# Patient Record
Sex: MALE | Race: WHITE | NOT HISPANIC OR LATINO | Employment: OTHER | URBAN - NONMETROPOLITAN AREA
[De-identification: names, ages, dates, MRNs, and addresses within clinical notes are randomized per-mention and may not be internally consistent; named-entity substitution may affect disease eponyms.]

---

## 2017-11-09 ENCOUNTER — OFFICE VISIT (OUTPATIENT)
Dept: CARDIOLOGY | Facility: CLINIC | Age: 72
End: 2017-11-09

## 2017-11-09 VITALS
HEART RATE: 86 BPM | SYSTOLIC BLOOD PRESSURE: 135 MMHG | DIASTOLIC BLOOD PRESSURE: 96 MMHG | OXYGEN SATURATION: 97 % | WEIGHT: 168.4 LBS | HEIGHT: 72 IN | BODY MASS INDEX: 22.81 KG/M2

## 2017-11-09 DIAGNOSIS — R06.02 SHORTNESS OF BREATH: ICD-10-CM

## 2017-11-09 DIAGNOSIS — R00.0 TACHYCARDIA: ICD-10-CM

## 2017-11-09 DIAGNOSIS — R00.2 PALPITATIONS: ICD-10-CM

## 2017-11-09 DIAGNOSIS — R53.83 OTHER FATIGUE: ICD-10-CM

## 2017-11-09 PROCEDURE — 93000 ELECTROCARDIOGRAM COMPLETE: CPT | Performed by: PHYSICIAN ASSISTANT

## 2017-11-09 PROCEDURE — 99204 OFFICE O/P NEW MOD 45 MIN: CPT | Performed by: PHYSICIAN ASSISTANT

## 2017-11-09 RX ORDER — LOVASTATIN 10 MG/1
20 TABLET ORAL DAILY
COMMUNITY
Start: 2014-09-25

## 2017-11-09 RX ORDER — ASPIRIN 81 MG/1
81 TABLET ORAL DAILY
COMMUNITY
End: 2023-01-12

## 2017-11-09 RX ORDER — CLONAZEPAM 0.5 MG/1
1 TABLET ORAL
COMMUNITY
Start: 2014-09-25

## 2017-11-09 NOTE — PROGRESS NOTES
Subjective   Mia Posadas is a 71 y.o. male     Chief Complaint   Patient presents with   • [facility] Care     patient appears in office today to [facility] cardiac care ; denies CP   • Irregular Heart Beat     patient states he is having arrythmia episodes    • Palpitations     patient states he does have occasional flutters with arrythmia       HPI  The patient presents today for initial evaluation.  He presents because of tachycardia palpitations.  He reports with very minor activity, he develops significant tachycardia, rates between 140-150 at times.  He then becomes very dyspneic and fatigued.  This has been a chronic issue for him but has worsened as of recent.  He had workup in 2014 including a catheterization.  Catheter that time suggested nonobstructive disease.  He had a stress test just to approximate 6 months ago.  He reports that the nuclear stress test indicated no ischemia.  No dysrhythmic symptoms were noted however this was done chemically.  He had an echo just prior to that which was apparently normal as well.  He reports that all cardiac testing was performed because of his symptoms of tachycardia with causes the fatigue and shortness of air.  The patient has no PND orthopnea.  Exercise capacity is very poor because of symptoms.  He reports that once he has onset of tachycardia, he can rest and symptoms tend to resolve.  He does have fatigue throughout the rest of the day however.  The patient has no further complaints otherwise.  He would like further evaluation because of symptoms as above.  We did do an EKG today which indicates sinus rhythm, right axis deviation, with no acute changes noted.      Current Outpatient Prescriptions   Medication Sig Dispense Refill   • aspirin 81 MG EC tablet Take 81 mg by mouth Daily.     • clonazePAM (KlonoPIN) 0.5 MG tablet Take 0.5 mg by mouth every night at bedtime.     • lovastatin (MEVACOR) 10 MG tablet Take 10 mg by mouth Daily.       No current  facility-administered medications for this visit.        Review of patient's allergies indicates no known allergies.    Past Medical History:   Diagnosis Date   • Insomnia    • Tinnitus     B ear ringing       Social History     Social History   • Marital status:      Spouse name: N/A   • Number of children: N/A   • Years of education: N/A     Occupational History   • Not on file.     Social History Main Topics   • Smoking status: Former Smoker     Packs/day: 1.00     Years: 10.00     Types: Cigarettes   • Smokeless tobacco: Current User     Types: Chew   • Alcohol use No   • Drug use: No   • Sexual activity: Defer     Other Topics Concern   • Not on file     Social History Narrative   • No narrative on file       Family History   Problem Relation Age of Onset   • Breast cancer Mother    • Arrhythmia Mother    • Mitral valve prolapse Mother    • No Known Problems Father        Review of Systems   Constitutional: Negative.  Negative for fatigue.   HENT: Positive for tinnitus ( B ear ringing). Negative for congestion, rhinorrhea, sneezing and sore throat.    Eyes: Positive for visual disturbance (wears glasses for reading).   Respiratory: Negative.  Negative for apnea, cough, chest tightness, shortness of breath (denies SOA) and wheezing.    Cardiovascular: Positive for palpitations (palpitations/flutters). Negative for chest pain (denies CP) and leg swelling.   Gastrointestinal: Negative.  Negative for abdominal distention, abdominal pain, nausea and vomiting.   Endocrine: Negative.  Negative for cold intolerance, heat intolerance, polyphagia and polyuria.   Genitourinary: Negative.  Negative for difficulty urinating, frequency and urgency.   Musculoskeletal: Positive for back pain (DDD spine). Negative for arthralgias, myalgias, neck pain and neck stiffness.   Skin: Negative.  Negative for rash and wound.   Allergic/Immunologic: Negative.  Negative for environmental allergies and food allergies.  "  Neurological: Negative.  Negative for dizziness, weakness, light-headedness and headaches.   Hematological: Negative.  Does not bruise/bleed easily.   Psychiatric/Behavioral: Positive for sleep disturbance (insomnia; denies smothering/SOA). Negative for agitation and confusion. The patient is not nervous/anxious.        Objective     /96 (BP Location: Left arm, Patient Position: Sitting)  Pulse 86  Ht 72\" (182.9 cm)  Wt 168 lb 6.4 oz (76.4 kg)  SpO2 97%  BMI 22.84 kg/m2    Lab Results (most recent)     None          Physical Exam   Constitutional: He is oriented to person, place, and time. He appears well-developed and well-nourished. No distress.   HENT:   Head: Normocephalic and atraumatic.   Eyes: Conjunctivae and EOM are normal. Pupils are equal, round, and reactive to light.   Neck: Normal range of motion. Neck supple. No JVD present. No tracheal deviation present.   Cardiovascular: Normal rate, regular rhythm, normal heart sounds and intact distal pulses.    Pulmonary/Chest: Effort normal and breath sounds normal.   Abdominal: Soft. Bowel sounds are normal. He exhibits no distension and no mass. There is no tenderness. There is no rebound and no guarding.   Musculoskeletal: Normal range of motion. He exhibits no edema, tenderness or deformity.   Neurological: He is alert and oriented to person, place, and time.   Skin: Skin is warm and dry. No rash noted. No erythema. No pallor.   Psychiatric: He has a normal mood and affect. His behavior is normal. Judgment and thought content normal.   Nursing note and vitals reviewed.      Procedure     ECG 12 Lead  Date/Time: 11/9/2017 2:32 PM  Performed by: SHELL LINTON  Authorized by: SHELL LINTON   Comments: Palpitations      Sinus rhythm, right axis deviation, no acute changes noted.                 Assessment/Plan      Diagnosis Plan   1. Palpitations  ECG 12 Lead    Treadmill Stress Test    Adult Transthoracic Echo Complete W/ Cont if Necessary " Per Protocol    Cardiac Event Monitor    Treadmill Stress Test    Adult Transthoracic Echo Complete W/ Cont if Necessary Per Protocol    Cardiac Event Monitor   2. Tachycardia  Treadmill Stress Test    Adult Transthoracic Echo Complete W/ Cont if Necessary Per Protocol    Cardiac Event Monitor    Treadmill Stress Test    Adult Transthoracic Echo Complete W/ Cont if Necessary Per Protocol    Cardiac Event Monitor   3. Shortness of breath  Treadmill Stress Test    Adult Transthoracic Echo Complete W/ Cont if Necessary Per Protocol    Cardiac Event Monitor    Treadmill Stress Test    Adult Transthoracic Echo Complete W/ Cont if Necessary Per Protocol    Cardiac Event Monitor   4. Other fatigue  Treadmill Stress Test    Adult Transthoracic Echo Complete W/ Cont if Necessary Per Protocol    Cardiac Event Monitor    Treadmill Stress Test    Adult Transthoracic Echo Complete W/ Cont if Necessary Per Protocol    Cardiac Event Monitor     Because of tachycardia/palpitations, I will schedule the patient for an event monitor.  Because symptoms tend to be exertional, I will schedule for a regular treadmill stress test to evaluate for the same.  I will schedule for an echo.  He reports that all recent labs of been normal.  We will try to obtain reports of those.  For now, I will make no changes in medications.  I will see results of the above and then we can see him back and recommend further.

## 2017-11-30 ENCOUNTER — OUTSIDE FACILITY SERVICE (OUTPATIENT)
Dept: CARDIOLOGY | Facility: CLINIC | Age: 72
End: 2017-11-30

## 2017-11-30 PROCEDURE — 93228 REMOTE 30 DAY ECG REV/REPORT: CPT | Performed by: INTERNAL MEDICINE

## 2017-12-04 ENCOUNTER — APPOINTMENT (OUTPATIENT)
Dept: CARDIOLOGY | Facility: HOSPITAL | Age: 72
End: 2017-12-04

## 2017-12-06 ENCOUNTER — OUTSIDE FACILITY SERVICE (OUTPATIENT)
Dept: CARDIOLOGY | Facility: CLINIC | Age: 72
End: 2017-12-06

## 2017-12-06 ENCOUNTER — HOSPITAL ENCOUNTER (OUTPATIENT)
Dept: CARDIOLOGY | Facility: HOSPITAL | Age: 72
Discharge: HOME | End: 2017-12-06
Admitting: PHYSICIAN ASSISTANT

## 2017-12-06 ENCOUNTER — HOSPITAL ENCOUNTER (OUTPATIENT)
Dept: CARDIOLOGY | Facility: HOSPITAL | Age: 72
Discharge: HOME | End: 2017-12-06

## 2017-12-06 LAB
MAXIMAL PREDICTED HEART RATE: 150 BPM
STRESS TARGET HR: 128 BPM

## 2017-12-06 PROCEDURE — 93306 TTE W/DOPPLER COMPLETE: CPT

## 2017-12-07 PROCEDURE — 93306 TTE W/DOPPLER COMPLETE: CPT | Performed by: INTERNAL MEDICINE

## 2017-12-08 ENCOUNTER — DOCUMENTATION (OUTPATIENT)
Dept: CARDIOLOGY | Facility: CLINIC | Age: 72
End: 2017-12-08

## 2017-12-11 PROCEDURE — 93018 CV STRESS TEST I&R ONLY: CPT | Performed by: INTERNAL MEDICINE

## 2017-12-12 ENCOUNTER — DOCUMENTATION (OUTPATIENT)
Dept: CARDIOLOGY | Facility: CLINIC | Age: 72
End: 2017-12-12

## 2018-01-10 ENCOUNTER — TELEPHONE (OUTPATIENT)
Dept: CARDIOLOGY | Facility: CLINIC | Age: 73
End: 2018-01-10

## 2018-01-11 ENCOUNTER — OFFICE VISIT (OUTPATIENT)
Dept: CARDIOLOGY | Facility: CLINIC | Age: 73
End: 2018-01-11

## 2018-01-11 VITALS
BODY MASS INDEX: 23.38 KG/M2 | HEIGHT: 72 IN | SYSTOLIC BLOOD PRESSURE: 133 MMHG | HEART RATE: 68 BPM | WEIGHT: 172.6 LBS | OXYGEN SATURATION: 97 % | DIASTOLIC BLOOD PRESSURE: 88 MMHG

## 2018-01-11 DIAGNOSIS — R53.83 OTHER FATIGUE: ICD-10-CM

## 2018-01-11 DIAGNOSIS — I35.1 AORTIC VALVE INSUFFICIENCY, ETIOLOGY OF CARDIAC VALVE DISEASE UNSPECIFIED: ICD-10-CM

## 2018-01-11 DIAGNOSIS — R06.02 SHORTNESS OF BREATH: ICD-10-CM

## 2018-01-11 DIAGNOSIS — I48.0 PAROXYSMAL ATRIAL FIBRILLATION (HCC): Primary | ICD-10-CM

## 2018-01-11 PROCEDURE — 99214 OFFICE O/P EST MOD 30 MIN: CPT | Performed by: PHYSICIAN ASSISTANT

## 2018-01-11 RX ORDER — LANSOPRAZOLE 15 MG/1
15 CAPSULE, DELAYED RELEASE ORAL DAILY
COMMUNITY
End: 2018-03-12 | Stop reason: ALTCHOICE

## 2018-01-11 RX ORDER — FLECAINIDE ACETATE 50 MG/1
50 TABLET ORAL EVERY 12 HOURS
Qty: 60 TABLET | Refills: 11 | Status: SHIPPED | OUTPATIENT
Start: 2018-01-11 | End: 2018-03-12 | Stop reason: SINTOL

## 2018-01-11 NOTE — PROGRESS NOTES
Problem list     Subjective   Mia Posadas is a 72 y.o. male     Chief Complaint   Patient presents with   • Follow-up     testing f/u    • Results     stress, echo and event monitor F/U    • Shortness of Breath   • Fatigue     anergy        HPI    Problem list  1.  Nonobstructive coronary artery disease by catheterization in 2014  1.1 regular treadmill stress test December 2017 with no evidence of ischemia  2.  His lipidemia  3.  Paroxysmal atrial fibrillation  3.1 event monitoring November 2017 demonstrates atrial fibrillation rapid  Ventricular response  3.2 chads score of 0-1 on aspirin  4.  Preserved systolic function  5.  Mild-to-moderate aortic insufficiency    Patient is a 72-year-old male that presents back to follow-up on stress test echocardiogram and event monitoring.    Patient describes doing relatively well.  He expresses no chest pain or pressure.  Dyspnea is very mild minimal at baseline and nothing progressive.  Denies PND orthopnea.  He will occasionally feel palpitations but no dizziness presyncope or syncope.  No symptoms of cerebral embolic events and otherwise is doing well    Echocardiogram demonstrates normal systolic function, mild diastolic dysfunction, mild-to-moderate AI but otherwise mildly dilated aortic root and otherwise normal  Event monitoring demonstrates paroxysmal atrial fibrillation with rapid ventricular response    Outpatient Encounter Prescriptions as of 1/11/2018   Medication Sig Dispense Refill   • aspirin 81 MG EC tablet Take 81 mg by mouth Daily.     • clonazePAM (KlonoPIN) 0.5 MG tablet Take 1 mg by mouth every night at bedtime.     • lansoprazole (PREVACID) 15 MG capsule Take 15 mg by mouth Daily.     • lovastatin (MEVACOR) 10 MG tablet Take 10 mg by mouth Daily.     • flecainide (TAMBOCOR) 50 MG tablet Take 1 tablet by mouth Every 12 (Twelve) Hours. 60 tablet 11     No facility-administered encounter medications on file as of 1/11/2018.        Review of patient's  allergies indicates no known allergies.    Past Medical History:   Diagnosis Date   • Insomnia    • Tinnitus     B ear ringing       Social History     Social History   • Marital status:      Spouse name: N/A   • Number of children: N/A   • Years of education: N/A     Occupational History   • Not on file.     Social History Main Topics   • Smoking status: Former Smoker     Packs/day: 1.00     Years: 10.00     Types: Cigarettes   • Smokeless tobacco: Current User     Types: Chew   • Alcohol use No   • Drug use: No   • Sexual activity: Defer     Other Topics Concern   • Not on file     Social History Narrative       Family History   Problem Relation Age of Onset   • Breast cancer Mother    • Arrhythmia Mother    • Mitral valve prolapse Mother    • No Known Problems Father        Review of Systems   Constitutional: Positive for fatigue (anergy ).   HENT: Positive for tinnitus. Negative for congestion, sinus pressure and voice change.    Eyes: Negative for visual disturbance.   Respiratory: Positive for shortness of breath. Negative for chest tightness, wheezing and stridor.    Cardiovascular: Positive for palpitations (rapid heart beat, runs about 120-130-s with activity ). Negative for chest pain and leg swelling.   Gastrointestinal: Negative.  Negative for abdominal pain, blood in stool (no melena, no hematuria, no hematemesis, no hematochezia ), constipation, diarrhea, nausea and vomiting.   Endocrine: Negative for cold intolerance and heat intolerance.   Genitourinary: Positive for frequency. Negative for difficulty urinating, hematuria and urgency.   Musculoskeletal: Positive for back pain.   Skin: Negative.  Negative for color change, pallor, rash and wound.   Allergic/Immunologic: Negative.  Negative for environmental allergies, food allergies and immunocompromised state.   Neurological: Positive for weakness (generalized ). Negative for dizziness, tremors, seizures, syncope, facial asymmetry (no stroke  "like symptoms), speech difficulty, light-headedness, numbness and headaches.   Psychiatric/Behavioral: Positive for sleep disturbance (only gets maybe 3-4 hrs a sleep at night ).       Objective   Vitals:    01/11/18 1425   BP: 133/88   BP Location: Left arm   Patient Position: Sitting   Pulse: 68   SpO2: 97%   Weight: 78.3 kg (172 lb 9.6 oz)   Height: 182.9 cm (72\")      /88 (BP Location: Left arm, Patient Position: Sitting)  Pulse 68  Ht 182.9 cm (72\")  Wt 78.3 kg (172 lb 9.6 oz)  SpO2 97%  BMI 23.41 kg/m2    Lab Results (most recent)     None          Physical Exam   Constitutional: He is oriented to person, place, and time. He appears well-developed and well-nourished. No distress.   HENT:   Head: Normocephalic and atraumatic.   Eyes: EOM are normal. Pupils are equal, round, and reactive to light.   Neck: No JVD present.   Cardiovascular: Normal rate, regular rhythm, normal heart sounds and intact distal pulses.  Exam reveals no gallop and no friction rub.    No murmur heard.  Pulmonary/Chest: Effort normal and breath sounds normal. No respiratory distress. He has no wheezes. He has no rales. He exhibits no tenderness.   Musculoskeletal: Normal range of motion. He exhibits no edema.   Neurological: He is alert and oriented to person, place, and time. No cranial nerve deficit.   Skin: Skin is warm and dry. No rash noted. No erythema. No pallor.   Psychiatric: He has a normal mood and affect. His behavior is normal.   Nursing note and vitals reviewed.      Procedure   Procedures       Assessment/Plan     Problems Addressed this Visit        Cardiovascular and Mediastinum    Paroxysmal atrial fibrillation - Primary    Aortic valve regurgitation       Respiratory    Shortness of breath       Other    Other fatigue          Recommendation  1.  Patient complains of shortness of breath with palpitations and mild fatigue.  Event monitoring demonstrates paroxysmal atrial fibrillation.  I feel rhythm control " would be optimal because of heart rates approaching 160 bpm during atrial fibrillation.  Therefore, because of no history of MI nor evidence of obstructive coronary artery disease, I feel flecainide could be a good antidysrhythmic medication.  He will start this on Saturday and return Tuesday for EKG 48 hours afterward.  2.  Patient's chest core 0-1 at this time.  Aspirin sufficient for stroke prophylaxis.  3.  We will see him back for follow-up as scheduled.  Follow-up primary as scheduled           Electronically signed by:

## 2018-01-22 ENCOUNTER — TELEPHONE (OUTPATIENT)
Dept: CARDIOLOGY | Facility: CLINIC | Age: 73
End: 2018-01-22

## 2018-01-22 NOTE — TELEPHONE ENCOUNTER
PATIENT WAS HERE TODAY FOR A NURSE VISIT, BUT CX'D PER ELROY TESFAYE. PATIENT TOOK FIRST DOSE OF FLECAINIDE ON SAT. AM AND APPROX. 4 HOURS LATER HAD THE FOLLOWING SX'S: NAUSEA, TINNITUS, HTN, H/R < 60, COULDN'T SLEEP THAT NIGHT. HE HAS NOT TAKEN ANYMORE, AND REFUSED TO TAKE ANYMORE, STATES TODAY IS THE FIRST DAY HE HAS FELT BETTER. HE STATED HE WAS NOT GOING TO TAKE ANY OTHER MEDS EITHER. PATIENT CAME WITH MONITOR RESULTS IN HAND AND STATED SHOWED ME WHERE HE HAD HAD ONLY 1 EPISODE OF A-FIB WITH H/R 100, AND NO MORE. V/S'S TODAY 144/93, H/R 66, 96%, WEIGHT 175.8. ELROY TESFAYE AWARE OF ABOVE AND STATES PATIENT MAY HAVE MORE EPISODES OF A-FIB, BUT SINCE DOESN'T WANT TO TAKE MEDS THEN SCHEDULE 4-6 WEEK. F/U. PATIENT HAD A F/U APPT. ALREADY IN MAY, BUT STATED HE WOULD COME BACK IN MIDDLE OF MARCH. I ASKED PATIENT IF HE COULD TELL WHEN HE WAS IN A-FIB AND HE STATED HE DID ONE TIME. I DISCUSSED WITH HIM THAT SOME PEOPLE CAN TELL WHEN THEY ARE IN A-FIB AND SOME CAN'T AND THAT HE MAY NOT BE ABLE TO TELL AND THAT THIS PUTS HIM AT INCREASED RISK, AND HE VERBALIZED HE UNDERSTOOD. TO CALL OFFICE WITH ANY PROBLEMS. SHAVON STOCK

## 2018-03-12 ENCOUNTER — OFFICE VISIT (OUTPATIENT)
Dept: CARDIOLOGY | Facility: CLINIC | Age: 73
End: 2018-03-12

## 2018-03-12 VITALS
DIASTOLIC BLOOD PRESSURE: 87 MMHG | HEART RATE: 82 BPM | BODY MASS INDEX: 23.16 KG/M2 | HEIGHT: 72 IN | SYSTOLIC BLOOD PRESSURE: 129 MMHG | WEIGHT: 171 LBS | OXYGEN SATURATION: 96 %

## 2018-03-12 DIAGNOSIS — R00.2 PALPITATIONS: ICD-10-CM

## 2018-03-12 DIAGNOSIS — I48.0 PAROXYSMAL ATRIAL FIBRILLATION (HCC): ICD-10-CM

## 2018-03-12 DIAGNOSIS — I10 ESSENTIAL HYPERTENSION: ICD-10-CM

## 2018-03-12 PROCEDURE — 99213 OFFICE O/P EST LOW 20 MIN: CPT | Performed by: PHYSICIAN ASSISTANT

## 2018-03-12 RX ORDER — DIPHENHYDRAMINE HCL 25 MG
25 CAPSULE ORAL
COMMUNITY

## 2018-03-12 NOTE — PROGRESS NOTES
Problem list     Subjective   Mia Posadas is a 72 y.o. male     Chief Complaint   Patient presents with   • Follow-up     patient appears in office today for follow up    • Atrial Fibrillation       HPI  The patient presents in for follow-up.  We had initially seen him in the setting of symptoms certainly to include palpitations.  His stress test and echo studies were basically unremarkable.  Event monitor indicated an episode of atrial fibrillation.  He was seen back through the clinic and started on flecainide therapy.  He cannot tolerate that because of symptoms.  He has not taken anything else.  He does have occasional episodes of palpitations but denies any significant or sustained dysrhythmic activity.  The patient has no chest pain of significance.  He reports stable dyspnea.  He has no further complaints otherwise.    Current Outpatient Prescriptions   Medication Sig Dispense Refill   • aspirin 81 MG EC tablet Take 81 mg by mouth Daily.     • clonazePAM (KlonoPIN) 0.5 MG tablet Take 1 mg by mouth every night at bedtime.     • diphenhydrAMINE (BENADRYL) 25 mg capsule Take 25 mg by mouth every night at bedtime.     • lovastatin (MEVACOR) 10 MG tablet Take 10 mg by mouth Daily.     • metoprolol tartrate (LOPRESSOR) 25 MG tablet Take 1 tablet by mouth 2 (Two) Times a Day. 60 tablet 11     No current facility-administered medications for this visit.        Flecainide    Past Medical History:   Diagnosis Date   • Insomnia    • Tinnitus     B ear ringing       Social History     Social History   • Marital status:      Spouse name: N/A   • Number of children: N/A   • Years of education: N/A     Occupational History   • Not on file.     Social History Main Topics   • Smoking status: Former Smoker     Packs/day: 1.00     Years: 10.00     Types: Cigarettes   • Smokeless tobacco: Current User     Types: Chew   • Alcohol use No   • Drug use: No   • Sexual activity: Defer     Other Topics Concern   • Not on  "file     Social History Narrative   • No narrative on file       Family History   Problem Relation Age of Onset   • Breast cancer Mother    • Arrhythmia Mother    • Mitral valve prolapse Mother    • No Known Problems Father        Review of Systems   Constitutional: Negative.  Negative for fatigue.   HENT: Negative.  Negative for congestion, rhinorrhea, sneezing and sore throat.    Eyes: Positive for visual disturbance (wears reading glasses).   Respiratory: Negative.  Negative for apnea, cough, chest tightness, shortness of breath (denies SOA) and wheezing.    Cardiovascular: Negative.  Negative for chest pain (denies CP), palpitations (denies palpitations) and leg swelling.   Gastrointestinal: Positive for nausea (frequent nausea). Negative for abdominal distention, abdominal pain and vomiting.   Endocrine: Negative.  Negative for cold intolerance, polyphagia and polyuria.   Genitourinary: Negative.  Negative for difficulty urinating, frequency and urgency.   Musculoskeletal: Negative.  Negative for arthralgias, back pain, myalgias and neck stiffness.   Skin: Negative.  Negative for rash and wound.   Allergic/Immunologic: Negative.  Negative for environmental allergies and food allergies.   Neurological: Negative.  Negative for dizziness, weakness, light-headedness and headaches.   Hematological: Negative.  Does not bruise/bleed easily.   Psychiatric/Behavioral: Positive for agitation (easily agitated). Negative for confusion and sleep disturbance (denies waking up smothering/SOA). The patient is not nervous/anxious.        Objective   Vitals:    03/12/18 1007   BP: 129/87   BP Location: Left arm   Patient Position: Sitting   Pulse: 82   SpO2: 96%   Weight: 77.6 kg (171 lb)   Height: 182.9 cm (72\")      /87 (BP Location: Left arm, Patient Position: Sitting)   Pulse 82   Ht 182.9 cm (72\")   Wt 77.6 kg (171 lb)   SpO2 96%   BMI 23.19 kg/m²    Lab Results (most recent)     None        Physical Exam "   Constitutional: He is oriented to person, place, and time. He appears well-developed and well-nourished. No distress.   HENT:   Head: Normocephalic and atraumatic.   Eyes: EOM are normal. Pupils are equal, round, and reactive to light.   Neck: No JVD present.   Cardiovascular: Normal rate, regular rhythm, normal heart sounds and intact distal pulses.  Exam reveals no gallop and no friction rub.    No murmur heard.  Pulmonary/Chest: Effort normal and breath sounds normal. No respiratory distress. He has no wheezes. He has no rales. He exhibits no tenderness.   Musculoskeletal: Normal range of motion. He exhibits no edema.   Neurological: He is alert and oriented to person, place, and time. No cranial nerve deficit.   Skin: Skin is warm and dry. No rash noted. No erythema. No pallor.   Psychiatric: He has a normal mood and affect. His behavior is normal.   Nursing note and vitals reviewed.        Procedure   Procedures       Assessment/Plan      Diagnosis Plan   1. Paroxysmal atrial fibrillation     2. Palpitations     3. Essential hypertension  metoprolol tartrate (LOPRESSOR) 25 MG tablet       Previous cardiac workup is been benign with the exception of his event monitor suggesting atrial fibrillation, all as above.  The patient did not tolerate flecainide therapy.  He does not want to rechallenge any antidysrhythmic therapy for now.  I will start the patient on low-dose beta blocker because of the A. fib and intermittent palpitations.  His CHADS score is currently 1.  I will continue aspirin alone for now.  The patient will call for any complications.  Otherwise we'll see him back in 2-3 months.  Nothing further for now.              Patient's BMI is within normal parameters. No follow-up required.             Electronically signed by:

## 2018-06-12 ENCOUNTER — OFFICE VISIT (OUTPATIENT)
Dept: CARDIOLOGY | Facility: CLINIC | Age: 73
End: 2018-06-12

## 2018-06-12 VITALS
HEIGHT: 72 IN | HEART RATE: 60 BPM | BODY MASS INDEX: 23.7 KG/M2 | OXYGEN SATURATION: 98 % | SYSTOLIC BLOOD PRESSURE: 140 MMHG | DIASTOLIC BLOOD PRESSURE: 89 MMHG | WEIGHT: 175 LBS

## 2018-06-12 DIAGNOSIS — R06.02 SHORTNESS OF BREATH: ICD-10-CM

## 2018-06-12 DIAGNOSIS — I48.0 PAROXYSMAL ATRIAL FIBRILLATION (HCC): Primary | ICD-10-CM

## 2018-06-12 DIAGNOSIS — R00.2 PALPITATIONS: ICD-10-CM

## 2018-06-12 DIAGNOSIS — I10 ESSENTIAL HYPERTENSION: ICD-10-CM

## 2018-06-12 PROCEDURE — 99213 OFFICE O/P EST LOW 20 MIN: CPT | Performed by: PHYSICIAN ASSISTANT

## 2018-06-12 PROCEDURE — 93000 ELECTROCARDIOGRAM COMPLETE: CPT | Performed by: PHYSICIAN ASSISTANT

## 2018-06-12 NOTE — PROGRESS NOTES
Problem list     Subjective   Mia Posadas is a 72 y.o. male     Chief Complaint   Patient presents with   • Follow-up     patient appears in office today for follow up    • Atrial Fibrillation       HPI  The patient presents in today for follow-up.  He was diagnosed with atrial fibrillation by previous testing.  Previous stress and echo studies otherwise were unremarkable.  He was tried on flecainide cannot tolerate that because of side effects.  He was placed on metoprolol for rate suppression.  We had discussed consideration for anticoagulation, but held on that.  We wanted to see him back today to see how he has symptomatically.  Currently, he has no chest pain.  He has stable dyspnea.  He relates to intermittent palpitations but nothing really of significance.  He tells me that the majority of his episodes of tachycardia and palpitations occur when overheated when working outside.  Unfortunately, he did have an MRI of the brain since last appointment.  He was told that he had a previous stroke.    Current Outpatient Prescriptions   Medication Sig Dispense Refill   • aspirin 81 MG EC tablet Take 81 mg by mouth Daily.     • clonazePAM (KlonoPIN) 0.5 MG tablet Take 1 mg by mouth every night at bedtime.     • diphenhydrAMINE (BENADRYL) 25 mg capsule Take 25 mg by mouth every night at bedtime.     • lovastatin (MEVACOR) 10 MG tablet Take 10 mg by mouth Daily.     • metoprolol tartrate (LOPRESSOR) 25 MG tablet Take 1 tablet by mouth 2 (Two) Times a Day. 60 tablet 11     No current facility-administered medications for this visit.        Flecainide    Past Medical History:   Diagnosis Date   • Insomnia    • Tinnitus     B ear ringing       Social History     Social History   • Marital status:      Spouse name: N/A   • Number of children: N/A   • Years of education: N/A     Occupational History   • Not on file.     Social History Main Topics   • Smoking status: Former Smoker     Packs/day: 1.00     Years:  "10.00     Types: Cigarettes   • Smokeless tobacco: Former User     Types: Chew   • Alcohol use No   • Drug use: No   • Sexual activity: Defer     Other Topics Concern   • Not on file     Social History Narrative   • No narrative on file       Family History   Problem Relation Age of Onset   • Breast cancer Mother    • Arrhythmia Mother    • Mitral valve prolapse Mother    • No Known Problems Father        Review of Systems   Constitutional: Positive for fatigue (easily fatigued).   HENT: Negative.  Negative for congestion, rhinorrhea, sneezing and sore throat.    Eyes: Positive for visual disturbance (wears reading glasses).   Respiratory: Negative.  Negative for apnea, cough, chest tightness, shortness of breath (denies SOA) and wheezing.    Cardiovascular: Negative.  Negative for chest pain (denies CP), palpitations (denies palpitations) and leg swelling.   Gastrointestinal: Positive for nausea (occasional nausea). Negative for abdominal distention, abdominal pain and vomiting.   Endocrine: Negative.  Negative for cold intolerance, heat intolerance, polyphagia and polyuria.   Musculoskeletal: Positive for arthralgias (joints), back pain (lower back and DDD), neck pain (neck pain due to arthritis and DDD) and neck stiffness (due to arthritis). Negative for myalgias.   Skin: Negative.  Negative for rash and wound.   Allergic/Immunologic: Negative.  Negative for environmental allergies and food allergies.   Neurological: Negative.  Negative for dizziness, weakness, light-headedness and headaches.   Hematological: Negative.  Does not bruise/bleed easily.   Psychiatric/Behavioral: Positive for agitation (easily agitated). Negative for confusion and sleep disturbance (denies waking up smothering/SOA). The patient is not nervous/anxious.        Objective   Vitals:    06/12/18 0859   BP: 140/89   BP Location: Left arm   Patient Position: Sitting   Pulse: 60   SpO2: 98%   Weight: 79.4 kg (175 lb)   Height: 182.9 cm (72\") " "     /89 (BP Location: Left arm, Patient Position: Sitting)   Pulse 60   Ht 182.9 cm (72\")   Wt 79.4 kg (175 lb)   SpO2 98%   BMI 23.73 kg/m²    Lab Results (most recent)     None        Physical Exam   Constitutional: He is oriented to person, place, and time. He appears well-developed and well-nourished. No distress.   HENT:   Head: Normocephalic and atraumatic.   Eyes: EOM are normal. Pupils are equal, round, and reactive to light.   Neck: No JVD present.   Cardiovascular: Normal rate, regular rhythm, normal heart sounds and intact distal pulses.  Exam reveals no gallop and no friction rub.    No murmur heard.  Pulmonary/Chest: Effort normal and breath sounds normal. No respiratory distress. He has no wheezes. He has no rales. He exhibits no tenderness.   Musculoskeletal: Normal range of motion. He exhibits no edema.   Neurological: He is alert and oriented to person, place, and time. No cranial nerve deficit.   Skin: Skin is warm and dry. No rash noted. No erythema. No pallor.   Psychiatric: He has a normal mood and affect. His behavior is normal.   Nursing note and vitals reviewed.        Procedure     ECG 12 Lead  Date/Time: 6/12/2018 9:07 AM  Performed by: SHELL LINTON  Authorized by: SHELL LINTON   Comments: Atrial fib    Sinus rhythm at 54, normal axis, early precordial R-wave progression, no acute changes noted.               Assessment/Plan      Diagnosis Plan   1. Paroxysmal atrial fibrillation  ECG 12 Lead   2. Palpitations     3. Essential hypertension     4. Shortness of breath         With history of CVA by MRI, I feel that the patient needs anticoagulation.  He was to discuss this with his family and if he decides institute that he will call.  He is well aware of CVA risk however with A. fib.  We encouraged that several times today.  Previous stress and echo studies of been unremarkable.  I will continue medications with no changes otherwise.  We will see the patient back in 2-3 " months just to evaluate course.  He will call me however with this decision on anticoagulation quickly.           Patient's Body mass index is 23.73 kg/m². BMI is within normal parameters. No follow-up required.             Electronically signed by:      Answers for HPI/ROS submitted by the patient on 6/5/2018   Neurologic complaint  Onset: more than 1 year ago  Onset quality: gradually  Progression since onset: rapidly worsening  auditory change: Yes  aura: No  bladder incontinence: No  bowel incontinence: No  vertigo: No  Treatments tried: acetaminophen, aspirin, medication, position change  Improvement on treatment: no relief

## 2018-11-29 ENCOUNTER — OFFICE VISIT (OUTPATIENT)
Dept: CARDIOLOGY | Facility: CLINIC | Age: 73
End: 2018-11-29

## 2018-11-29 VITALS
OXYGEN SATURATION: 96 % | HEIGHT: 72 IN | SYSTOLIC BLOOD PRESSURE: 136 MMHG | HEART RATE: 72 BPM | DIASTOLIC BLOOD PRESSURE: 84 MMHG | BODY MASS INDEX: 23.78 KG/M2 | WEIGHT: 175.6 LBS

## 2018-11-29 DIAGNOSIS — R00.2 PALPITATIONS: ICD-10-CM

## 2018-11-29 DIAGNOSIS — R06.02 SHORTNESS OF BREATH: ICD-10-CM

## 2018-11-29 DIAGNOSIS — I48.0 PAROXYSMAL ATRIAL FIBRILLATION (HCC): Primary | ICD-10-CM

## 2018-11-29 PROCEDURE — 99213 OFFICE O/P EST LOW 20 MIN: CPT | Performed by: PHYSICIAN ASSISTANT

## 2018-11-29 NOTE — PROGRESS NOTES
Problem list     Subjective   Mia Posadas is a 72 y.o. male     Chief Complaint   Patient presents with   • Follow-up     patient appears in office today for 3 month follow up    • Atrial Fibrillation       HPI  The patient presents back for review and evaluation.  History includes atrial fibrillation.  We detected this on his event monitor previously.  We had recommended consideration for anticoagulation, particularly with history of stroke.  The patient tells me today that he does not believe he has A. fib.  He is concerned about the findings of the event monitor.  I reviewed the telemetry slip with the patient and reviewed the specific episodes of A. fib.  He does not feel that he has that problem.  He therefore does not feel that he needs anticoagulation.  I reviewed with him that his risk is very high because of A. fib and history of stroke of another recurrent stroke.  The patient acknowledges his understanding and tells me that aspirin alone is fine for him.    He also tells me that he is doing well with just rate control medications.  We have tried him on flecainide previously and he cannot tolerate that.  He tells me that he, approximate 1, month, I will have a pulse excursion.  He reports that typically this would be to 120 bpm and last only a few minutes.  Once he sits down and rests, his symptoms resolved.  Otherwise, the patient has no chest pain.  He reports stable dyspnea.  He has no PND orthopnea.  He has no further complaints otherwise.    Current Outpatient Medications   Medication Sig Dispense Refill   • aspirin 81 MG EC tablet Take 81 mg by mouth Daily.     • clonazePAM (KlonoPIN) 0.5 MG tablet Take 1 mg by mouth every night at bedtime.     • diphenhydrAMINE (BENADRYL) 25 mg capsule Take 25 mg by mouth every night at bedtime.     • lovastatin (MEVACOR) 10 MG tablet Take 10 mg by mouth Daily.     • metoprolol tartrate (LOPRESSOR) 25 MG tablet Take 1 tablet by mouth 2 (Two) Times a Day.  (Patient taking differently: Take 25 mg by mouth As Needed (if HR elevated).) 60 tablet 11     No current facility-administered medications for this visit.        Flecainide    Past Medical History:   Diagnosis Date   • Insomnia    • Tinnitus     B ear ringing       Social History     Socioeconomic History   • Marital status:      Spouse name: Not on file   • Number of children: Not on file   • Years of education: Not on file   • Highest education level: Not on file   Social Needs   • Financial resource strain: Not on file   • Food insecurity - worry: Not on file   • Food insecurity - inability: Not on file   • Transportation needs - medical: Not on file   • Transportation needs - non-medical: Not on file   Occupational History   • Not on file   Tobacco Use   • Smoking status: Former Smoker     Packs/day: 1.00     Years: 10.00     Pack years: 10.00     Types: Cigarettes   • Smokeless tobacco: Former User     Types: Chew   Substance and Sexual Activity   • Alcohol use: No   • Drug use: No   • Sexual activity: Defer   Other Topics Concern   • Not on file   Social History Narrative   • Not on file       Family History   Problem Relation Age of Onset   • Breast cancer Mother    • Arrhythmia Mother    • Mitral valve prolapse Mother    • No Known Problems Father        Review of Systems   Constitutional: Negative.  Negative for fatigue.   HENT: Positive for hearing loss (B hearing loss). Negative for congestion, rhinorrhea, sneezing and sore throat.    Eyes: Positive for visual disturbance (wears glasses PRN).   Respiratory: Negative.  Negative for apnea, cough, chest tightness, shortness of breath (denies CP) and wheezing.    Cardiovascular: Negative.  Negative for chest pain (denies CP), palpitations (denies palpitations) and leg swelling.   Gastrointestinal: Negative.  Negative for abdominal distention, abdominal pain, nausea and vomiting.   Endocrine: Negative.  Negative for cold intolerance and heat  "intolerance.   Genitourinary: Negative.  Negative for difficulty urinating, frequency and urgency.   Musculoskeletal: Positive for arthralgias (joints) and back pain (back pain from arthritis). Negative for myalgias, neck pain and neck stiffness.   Skin: Negative.  Negative for rash and wound.   Allergic/Immunologic: Negative.  Negative for environmental allergies and food allergies.   Neurological: Negative.  Negative for dizziness, syncope, weakness, light-headedness and headaches.   Hematological: Bruises/bleeds easily (bruises easily).   Psychiatric/Behavioral: Positive for agitation (easily agitated). Negative for confusion and sleep disturbance (denies waking up smothering/SOA). The patient is nervous/anxious (easily nervous/anxious).        Objective   Vitals:    11/29/18 1019   BP: 136/84   BP Location: Left arm   Patient Position: Sitting   Pulse: 72   SpO2: 96%   Weight: 79.7 kg (175 lb 9.6 oz)   Height: 182.9 cm (72\")      /84 (BP Location: Left arm, Patient Position: Sitting)   Pulse 72   Ht 182.9 cm (72\")   Wt 79.7 kg (175 lb 9.6 oz)   SpO2 96%   BMI 23.82 kg/m²    Lab Results (most recent)     None        Physical Exam   Constitutional: He is oriented to person, place, and time. He appears well-developed and well-nourished. No distress.   HENT:   Head: Normocephalic and atraumatic.   Eyes: EOM are normal. Pupils are equal, round, and reactive to light.   Neck: No JVD present.   Cardiovascular: Normal rate, regular rhythm, normal heart sounds and intact distal pulses. Exam reveals no gallop and no friction rub.   No murmur heard.  Pulmonary/Chest: Effort normal and breath sounds normal. No respiratory distress. He has no wheezes. He has no rales. He exhibits no tenderness.   Musculoskeletal: Normal range of motion. He exhibits no edema.   Neurological: He is alert and oriented to person, place, and time. No cranial nerve deficit.   Skin: Skin is warm and dry. No rash noted. No erythema. No " pallor.   Psychiatric: He has a normal mood and affect. His behavior is normal.   Nursing note and vitals reviewed.        Procedure   Procedures       Assessment/Plan      Diagnosis Plan   1. Paroxysmal atrial fibrillation (CMS/HCC)     2. Shortness of breath     3. Palpitations         The patient tells me that he is doing well.  I will make no changes.  We can see him back in 4 months, sooner if indicated by course.  He again does not want any consideration for anticoagulation therapy, all as outlined above.         Patient's Body mass index is 23.82 kg/m². BMI is within normal parameters. No follow-up required.             Electronically signed by:

## 2019-03-28 ENCOUNTER — OFFICE VISIT (OUTPATIENT)
Dept: CARDIOLOGY | Facility: CLINIC | Age: 74
End: 2019-03-28

## 2019-03-28 VITALS
WEIGHT: 169.4 LBS | SYSTOLIC BLOOD PRESSURE: 123 MMHG | DIASTOLIC BLOOD PRESSURE: 88 MMHG | OXYGEN SATURATION: 97 % | BODY MASS INDEX: 22.94 KG/M2 | HEIGHT: 72 IN | HEART RATE: 86 BPM

## 2019-03-28 DIAGNOSIS — R00.2 PALPITATIONS: ICD-10-CM

## 2019-03-28 DIAGNOSIS — R06.02 SHORTNESS OF BREATH: ICD-10-CM

## 2019-03-28 DIAGNOSIS — I48.0 PAROXYSMAL ATRIAL FIBRILLATION (HCC): Primary | ICD-10-CM

## 2019-03-28 PROCEDURE — 99213 OFFICE O/P EST LOW 20 MIN: CPT | Performed by: PHYSICIAN ASSISTANT

## 2019-03-28 PROCEDURE — 93000 ELECTROCARDIOGRAM COMPLETE: CPT | Performed by: PHYSICIAN ASSISTANT

## 2019-03-28 NOTE — PROGRESS NOTES
Problem list     Subjective   Mia Posadas is a 73 y.o. male     Chief Complaint   Patient presents with   • Atrial Fibrillation       HPI  The patient presents in today for follow-up.  History includes A. fib is noted by previous event monitor.  The patient feels that this likely could have been artifact or mistake by the telemetry and has had difficulties in the past excepting a diagnosis of A. fib.  He has also been hesitant to take the metoprolol daily, concerned with side effects.  We have discussed anticoagulation therapies with him in the past.  He would not want anticoagulation.  The patient tells me that his palpitations and episodes of tachycardia however have worsened.  He has been having increasing frequency of palpitations and tachycardia.  His episodes appear to be occurring slightly longer and appear a little more severe with regards to his symptoms of dysrhythmias.  He has no associated chest pain.  He has stable dyspnea.  He has no PND orthopnea.  He has no further complaints otherwise.    Current Outpatient Medications   Medication Sig Dispense Refill   • aspirin 81 MG EC tablet Take 81 mg by mouth Daily.     • clonazePAM (KlonoPIN) 0.5 MG tablet Take 1 mg by mouth every night at bedtime.     • diphenhydrAMINE (BENADRYL) 25 mg capsule Take 25 mg by mouth every night at bedtime.     • lovastatin (MEVACOR) 10 MG tablet Take 10 mg by mouth Daily.     • metoprolol tartrate (LOPRESSOR) 25 MG tablet Take 1 tablet by mouth Daily. 30 tablet 3     No current facility-administered medications for this visit.        Flecainide    Past Medical History:   Diagnosis Date   • Atrial fibrillation (CMS/HCC)    • Insomnia    • Tinnitus     B ear ringing       Social History     Socioeconomic History   • Marital status:      Spouse name: Not on file   • Number of children: Not on file   • Years of education: Not on file   • Highest education level: Not on file   Tobacco Use   • Smoking status: Former Smoker  "    Packs/day: 1.00     Years: 10.00     Pack years: 10.00     Types: Cigarettes   • Smokeless tobacco: Former User     Types: Chew   Substance and Sexual Activity   • Alcohol use: No   • Drug use: No   • Sexual activity: Defer       Family History   Problem Relation Age of Onset   • Breast cancer Mother    • Arrhythmia Mother    • Mitral valve prolapse Mother    • No Known Problems Father        Review of Systems   Constitutional: Positive for fatigue (easily fatigued).   HENT: Negative.  Negative for congestion, rhinorrhea and sneezing.    Eyes: Positive for visual disturbance (wears reading glasses).   Respiratory: Positive for shortness of breath (easily SOA ; worse on exertion ). Negative for cough, chest tightness and wheezing.    Cardiovascular: Positive for palpitations (palpitations/flutters on occasion). Negative for chest pain and leg swelling.   Gastrointestinal: Negative.  Negative for abdominal pain, diarrhea and vomiting.   Endocrine: Negative.  Negative for cold intolerance and heat intolerance.   Genitourinary: Negative.  Negative for difficulty urinating, frequency and urgency.   Musculoskeletal: Positive for arthralgias (joints). Negative for back pain, myalgias, neck pain and neck stiffness.   Skin: Negative.  Negative for rash and wound.   Allergic/Immunologic: Negative.  Negative for environmental allergies and food allergies.   Neurological: Negative.  Negative for dizziness, syncope, weakness, light-headedness and headaches.   Hematological: Bruises/bleeds easily (bruises easily).   Psychiatric/Behavioral: Negative for agitation, confusion and sleep disturbance (denies waking up smothering/SOA). The patient is nervous/anxious (easily nervous/anxious).        Objective   Vitals:    03/28/19 1002   BP: 123/88   BP Location: Left arm   Patient Position: Sitting   Pulse: 86   SpO2: 97%   Weight: 76.8 kg (169 lb 6.4 oz)   Height: 182.9 cm (72\")      /88 (BP Location: Left arm, Patient " "Position: Sitting)   Pulse 86   Ht 182.9 cm (72\")   Wt 76.8 kg (169 lb 6.4 oz)   SpO2 97%   BMI 22.97 kg/m²    Lab Results (most recent)     None        Physical Exam   Constitutional: He is oriented to person, place, and time. He appears well-developed and well-nourished. No distress.   HENT:   Head: Normocephalic and atraumatic.   Eyes: EOM are normal. Pupils are equal, round, and reactive to light.   Neck: No JVD present.   Cardiovascular: Normal rate, regular rhythm, normal heart sounds and intact distal pulses. Exam reveals no gallop and no friction rub.   No murmur heard.  Pulmonary/Chest: Effort normal and breath sounds normal. No respiratory distress. He has no wheezes. He has no rales. He exhibits no tenderness.   Musculoskeletal: Normal range of motion. He exhibits no edema.   Neurological: He is alert and oriented to person, place, and time. No cranial nerve deficit.   Skin: Skin is warm and dry. No rash noted. No erythema. No pallor.   Psychiatric: He has a normal mood and affect. His behavior is normal.   Nursing note and vitals reviewed.        Procedure     ECG 12 Lead  Date/Time: 3/28/2019 10:24 AM  Performed by: Ethan Garcia PA  Authorized by: Ethan Garcia PA   Comments: Atrial fib    Sinus rhythm 73, normal axis, no acute changes noted.               Assessment/Plan      Diagnosis Plan   1. Paroxysmal atrial fibrillation (CMS/HCC)  ECG 12 Lead    Cardiac Event Monitor   2. Shortness of breath  ECG 12 Lead    Cardiac Event Monitor   3. Palpitations  Cardiac Event Monitor       The patient does agree to consider repeating an event monitor.  His palpitations/tachycardia, presumably representative of sensed episodes of A. fib, have worsened as of recent.  He also agrees to go ahead and restart metoprolol on a daily basis as prescribed.  Pending results, we had discussed consideration for rechallenge of antidysrhythmic therapy with the patient.  He might agree to that pending results of " event monitor.  We will see him back after event monitor findings are known and recommended further at that time.  For now he agrees to continue aspirin alone in combination with his metoprolol therapy.  He will call for any issues prior to follow-up.         Patient's Body mass index is 22.97 kg/m². BMI is within normal parameters. No follow-up required..             Electronically signed by:

## 2019-05-06 ENCOUNTER — OUTSIDE FACILITY SERVICE (OUTPATIENT)
Dept: CARDIOLOGY | Facility: CLINIC | Age: 74
End: 2019-05-06

## 2019-05-08 ENCOUNTER — OUTSIDE FACILITY SERVICE (OUTPATIENT)
Dept: CARDIOLOGY | Facility: CLINIC | Age: 74
End: 2019-05-08
Payer: MEDICARE

## 2019-05-08 PROCEDURE — 93228 REMOTE 30 DAY ECG REV/REPORT: CPT | Performed by: INTERNAL MEDICINE

## 2019-07-01 ENCOUNTER — OFFICE VISIT (OUTPATIENT)
Dept: CARDIOLOGY | Facility: CLINIC | Age: 74
End: 2019-07-01

## 2019-07-01 VITALS
WEIGHT: 175 LBS | BODY MASS INDEX: 23.7 KG/M2 | HEIGHT: 72 IN | HEART RATE: 60 BPM | DIASTOLIC BLOOD PRESSURE: 85 MMHG | SYSTOLIC BLOOD PRESSURE: 136 MMHG | OXYGEN SATURATION: 96 %

## 2019-07-01 DIAGNOSIS — R06.02 SHORTNESS OF BREATH: ICD-10-CM

## 2019-07-01 DIAGNOSIS — R53.83 OTHER FATIGUE: ICD-10-CM

## 2019-07-01 DIAGNOSIS — I48.0 PAROXYSMAL ATRIAL FIBRILLATION (HCC): Primary | ICD-10-CM

## 2019-07-01 PROCEDURE — 99213 OFFICE O/P EST LOW 20 MIN: CPT | Performed by: PHYSICIAN ASSISTANT

## 2019-07-01 RX ORDER — AMITRIPTYLINE HYDROCHLORIDE 25 MG/1
25 TABLET, FILM COATED ORAL AS NEEDED
COMMUNITY

## 2019-07-01 NOTE — PROGRESS NOTES
Problem list     Subjective   Mia Posadas is a 73 y.o. male     Chief Complaint   Patient presents with   • Atrial Fibrillation       HPI  The patient presents back today to review event monitor results.  We had seen him previously for follow-up on a stress test, echo, and event monitor.  That follow-up was a couple of years ago.  His stress test indicated no ischemia.  His echo indicated preserved systolic function with no significant valvular issues.  Monitor indicated brief episodes of A. fib.  We discussed this with him at follow-up and he did not believe that he had atrial fibrillation.  We discussed consideration even for anticoagulation which he did not want as he did not feel that he had atrial fibrillation.  We wanted to repeat a monitor at the patient's request because of increasing palpitations.  The follow-up monitor indicated no evidence of A. fib.  He had sinus rhythm throughout with bradycardia noted mostly at night.  Symptomatically, the patient appears to be doing fairly well.  He has restarted metoprolol and reports that he feels that his palpitations have minimized with that.  He has stable dyspnea.  He has no chest pain.  He has no failure symptoms.  He has no complications or complaints otherwise.    Current Outpatient Medications   Medication Sig Dispense Refill   • amitriptyline (ELAVIL) 25 MG tablet Take 25 mg by mouth Every Night.     • aspirin 81 MG EC tablet Take 81 mg by mouth Daily.     • clonazePAM (KlonoPIN) 0.5 MG tablet Take 1 mg by mouth every night at bedtime.     • diphenhydrAMINE (BENADRYL) 25 mg capsule Take 25 mg by mouth every night at bedtime.     • lovastatin (MEVACOR) 10 MG tablet Take 10 mg by mouth Daily.     • metoprolol tartrate (LOPRESSOR) 25 MG tablet Take 1 tablet by mouth Daily. 30 tablet 3     No current facility-administered medications for this visit.        Flecainide    Past Medical History:   Diagnosis Date   • Atrial fibrillation (CMS/HCC)    • Insomnia     • Tinnitus     B ear ringing       Social History     Socioeconomic History   • Marital status:      Spouse name: Not on file   • Number of children: Not on file   • Years of education: Not on file   • Highest education level: Not on file   Tobacco Use   • Smoking status: Former Smoker     Packs/day: 1.00     Years: 10.00     Pack years: 10.00     Types: Cigarettes   • Smokeless tobacco: Former User     Types: Chew   Substance and Sexual Activity   • Alcohol use: No   • Drug use: No   • Sexual activity: Defer       Family History   Problem Relation Age of Onset   • Breast cancer Mother    • Arrhythmia Mother    • Mitral valve prolapse Mother    • No Known Problems Father        Review of Systems   Constitutional: Positive for fatigue (easily fatigued).   HENT: Negative.  Negative for congestion, rhinorrhea and sneezing.    Eyes: Positive for visual disturbance (wears reading glasses).   Respiratory: Negative.  Negative for cough, chest tightness, shortness of breath and wheezing.    Cardiovascular: Negative.  Negative for chest pain, palpitations and leg swelling.   Gastrointestinal: Negative.  Negative for abdominal pain, nausea and vomiting.   Endocrine: Negative.  Negative for cold intolerance and heat intolerance.   Genitourinary: Negative.  Negative for difficulty urinating, frequency and urgency.   Musculoskeletal: Positive for arthralgias (joints). Negative for back pain, neck pain and neck stiffness.   Skin: Negative.  Negative for rash and wound.   Allergic/Immunologic: Negative.  Negative for environmental allergies and food allergies.   Neurological: Negative.  Negative for dizziness, syncope, weakness, light-headedness and headaches.   Hematological: Bruises/bleeds easily (bruises easily).   Psychiatric/Behavioral: Negative.  Negative for agitation, confusion and sleep disturbance (denies waking up smothering/SOA). The patient is not nervous/anxious.        Objective   Vitals:    07/01/19 0928  "  BP: 136/85   BP Location: Left arm   Patient Position: Sitting   Pulse: 60   SpO2: 96%   Weight: 79.4 kg (175 lb)   Height: 182.9 cm (72\")      /85 (BP Location: Left arm, Patient Position: Sitting)   Pulse 60   Ht 182.9 cm (72\")   Wt 79.4 kg (175 lb)   SpO2 96%   BMI 23.73 kg/m²    Lab Results (most recent)     None        Physical Exam   Constitutional: He is oriented to person, place, and time. He appears well-developed and well-nourished. No distress.   HENT:   Head: Normocephalic and atraumatic.   Eyes: EOM are normal. Pupils are equal, round, and reactive to light.   Neck: No JVD present.   Cardiovascular: Normal rate, regular rhythm, normal heart sounds and intact distal pulses. Exam reveals no gallop and no friction rub.   No murmur heard.  Pulmonary/Chest: Effort normal and breath sounds normal. No respiratory distress. He has no wheezes. He has no rales. He exhibits no tenderness.   Musculoskeletal: Normal range of motion. He exhibits no edema.   Neurological: He is alert and oriented to person, place, and time. No cranial nerve deficit.   Skin: Skin is warm and dry. No rash noted. No erythema. No pallor.   Psychiatric: He has a normal mood and affect. His behavior is normal.   Nursing note and vitals reviewed.        Procedure   Procedures       Assessment/Plan      Diagnosis Plan   1. Paroxysmal atrial fibrillation (CMS/HCC)     2. Shortness of breath     3. Other fatigue       1.  We have reviewed the patient's event monitor with him today.  He has had no evidence of recurrent episodes of A. fib.  His previous monitor however suggested the same.  He has restarted metoprolol therapy and feels that symptoms of palpitations have improved with that.  I would continue beta-blocker therapy without change.  We have discussed anticoagulation in the past which he does not want, all as outlined previously.    2.  The patient feels that his shortness of air is at baseline.  Previous work-up from " cardiac standpoint including stress test and echo studies were unremarkable.  I feel nothing further is indicated at this time for the same.    3.  He also tells me that he has stable fatigue.  This seems to be at baseline.  Laboratories with his primary care provider have been unremarkable by patient report.  He will report for any worsened fatigue or issues otherwise.    4.  At this time, we will see him on 6-month intervals.  He will call for complications prior to follow-up.  Nothing further for now.            Patient's Body mass index is 23.73 kg/m². BMI is within normal parameters. No follow-up required..             Electronically signed by:

## 2019-07-22 ENCOUNTER — TELEPHONE (OUTPATIENT)
Dept: CARDIOLOGY | Facility: CLINIC | Age: 74
End: 2019-07-22

## 2019-07-22 NOTE — TELEPHONE ENCOUNTER
Called patient back and notified him to continue and monitor blood pressure for next few days. If it continues or consecutively runs greater than 160/90 call office as we will need to change or add medications at that time. -;CARMELO

## 2019-07-22 NOTE — TELEPHONE ENCOUNTER
Called patient, states at this time blood pressure has been slightly elevated for past 4 days. Heart rate has been WNL. Only changes he has made was ENT increased Amitriptyline to 25 mg daily. Patient does not know if this medication change could have caused this. He stated he does not want anything changed at this time, but does not know what to do. What do you suggest? Do you want him to continue and monitor blood pressure and heart rate longer?

## 2019-07-22 NOTE — TELEPHONE ENCOUNTER
Patient said he was asked to  call for Laura to give BP and P log since restarting metoprolol.  134/86,P72, 142/92, P69,135/87, 78, 112/84, 73.  I did not see this noted in the chart 7/1/19 however, patient said provider told him to call and he may have to come in for EKG and NV ?  He is requesting a call from Laura.  Patient called 2x this morning.   BRENDA,CMA

## 2019-08-08 ENCOUNTER — OFFICE VISIT (OUTPATIENT)
Dept: CARDIOLOGY | Facility: CLINIC | Age: 74
End: 2019-08-08

## 2019-08-08 VITALS
BODY MASS INDEX: 23.3 KG/M2 | WEIGHT: 172 LBS | HEART RATE: 57 BPM | HEIGHT: 72 IN | DIASTOLIC BLOOD PRESSURE: 85 MMHG | SYSTOLIC BLOOD PRESSURE: 133 MMHG | OXYGEN SATURATION: 100 %

## 2019-08-08 DIAGNOSIS — R06.02 SHORTNESS OF BREATH: ICD-10-CM

## 2019-08-08 DIAGNOSIS — I48.0 PAROXYSMAL ATRIAL FIBRILLATION (HCC): Primary | ICD-10-CM

## 2019-08-08 DIAGNOSIS — R00.2 PALPITATIONS: ICD-10-CM

## 2019-08-08 PROCEDURE — 99213 OFFICE O/P EST LOW 20 MIN: CPT | Performed by: PHYSICIAN ASSISTANT

## 2019-08-08 NOTE — PATIENT INSTRUCTIONS
Smokeless Tobacco Information, Adult  Tobacco use is one of the leading causes of cancer and other chronic health problems. Smokeless tobacco is tobacco that is put directly into the mouth instead of being smoked. It may also be called chewing tobacco or snuff. Smokeless tobacco is made from the leaves of tobacco plants and it comes in several forms:  · Loose, dry leaves, plugs, or twists.  · Moist pouches.  · Dissolving lozenges or strips.  Chewing, sucking, or holding the tobacco in your mouth causes your mouth to make more saliva. The saliva mixes with the tobacco to make “tobacco juice” that is swallowed or spit out.  How can smokeless tobacco affect me?  Using smokeless tobacco:  · Increases your risk of developing cancer. Smokeless tobacco contains at least 28 different types of cancer-causing chemicals (carcinogens).  · Increases your chances of developing other long-term health problems, including high blood pressure, heart disease, stroke, and dental problems.  · Can make you become addicted. Nicotine is one of the chemicals in tobacco. When you chew tobacco, you absorb nicotine from the tobacco juice. This can make you feel more alert than usual.  · Can cause problems with pregnancy. Pregnant women who use smokeless tobacco are more likely to miscarry or deliver a baby too early (premature delivery).  · Can affect the appearance and health of your mouth. Using smokeless tobacco may cause bad breath, yellow-brown teeth, mouth sores, cracking and bleeding lips, gum recession, and lesions on the soft tissues of your mouth (leukoplakia).  What are the benefits of not using smokeless tobacco?  The benefits of not using smokeless tobacco include:  · A healthy mind because:  ? You avoid addiction.  · A healthy body because:  ? You avoid dental problems.  ? You promote healthy pregnancy.  ? You avoid long-term health problems.  · A healthy wallet because:  ? You avoid costs of buying tobacco.  ? You avoid health  care costs in the future.  · A healthy family because:  ? You avoid accidental poisoning of children in your household.  What can happen if I continue to use smokeless tobacco?  If you continue to use smokeless tobacco, you will increase your risk for developing certain cancers. These include:  · Tongue.  · Lips, mouth, and gums.  · Throat (esophagus) and voice box (larynx).  · Stomach.  · Pancreas.  · Bladder.  · Colon.  Long-term use of smokeless tobacco can also lead to:  · High blood pressure, heart disease, and stroke.  · Gum disease, gum recession, and bone loss around the teeth.  · Tooth decay.  How do I quit using smokeless tobacco?  Quitting the use of smokeless tobacco can be hard, but it can be done. Follow these steps:  · Pick a date to quit. Set a date within the next two weeks. This gives you time to prepare.  · Write down the reasons why you are quitting. Keep this list in places where you will see it often, such as on your bathroom mirror or in your car or wallet.  · Identify the people, places, things, and activities that make you want to use tobacco (triggers) and avoid them.  · Get rid of any tobacco you have and remove any tobacco smells. To do this:  ? Throw away all containers of tobacco at home, at work, and in your car.  ? Throw away any other items that you use regularly when you chew tobacco.  ? Clean your car and make sure to remove all tobacco-related items.  ? Clean your home, including curtains and carpets.  · Tell your family, friends, and coworkers that you are quitting. This can make quitting easier.  · Ask your health care provider for help quitting smokeless tobacco. This may involve treatment. Find out what treatment options are covered by your health insurance.  · Keep track of how many days have passed since you quit. Remembering how long and hard you have worked to quit can help you avoid using tobacco again.  Where can I get support?  Ask your health care provider if there is  a local support group for quitting smokeless tobacco.  Where can I get more information?  You can learn more about the risks of using smokeless tobacco and the benefits of quitting from these sources:  · National Cancer [place]: www.cancer.gov  · American Cancer Society: www.cancer.org  When should I seek medical care?  Seek medical care if you have:  · White or other discolored patches in your mouth.  · Difficulty swallowing.  · A change in your voice.  · Unexplained weight loss.  · Stomach pain, nausea, or vomiting.  Summary  · Smokeless tobacco contains at least 28 different chemicals that are known to cause cancer (carcinogen).  · Nicotine is an addictive chemical in smokeless tobacco.  · When you quit using smokeless tobacco, you lower your risk of developing cancer.  This information is not intended to replace advice given to you by your health care provider. Make sure you discuss any questions you have with your health care provider.  Document Released: 05/21/2012 Document Revised: 08/03/2018 Document Reviewed: 07/29/2016  [employer] Interactive Patient Education © 2019 [employer]

## 2019-08-08 NOTE — PROGRESS NOTES
Problem list     Subjective   Mia Posadas is a 73 y.o. male     Chief Complaint   Patient presents with   • Paroxysmal atrial fibrillation       HPI  The patient presents in for hospital follow-up.  We had followed him previously for A. fib as documented by prior monitor, remote.  The patient did not believe he had A. fib.  We had recommended rhythm control medications and anticoagulation.  Because he did not feel that he had A. fib he did not want those medicines.  He opted alternately for a follow-up monitor which indicated no evidence of A. fib.  In that setting, he wanted no modification whatsoever of his medical regimen to address A. fib as he again did not feel that he had that.  The patient had onset of tachycardia dysrhythmic activity however and had to go to the emergency room.  He again was notified that he had A. fib with rapid ventricular response.  He was admitted, given rate control medication IV, and started on anticoagulation therapy.  He is now been discharged on metoprolol for which he took before.  He is on Eliquis 5 mg twice daily.  His medical regimen was not changed otherwise.  He was advised to follow-up with us today.    Current Outpatient Medications   Medication Sig Dispense Refill   • amitriptyline (ELAVIL) 25 MG tablet Take 25 mg by mouth Every Night.     • apixaban (ELIQUIS) 5 MG tablet tablet Take 5 mg by mouth 2 (Two) Times a Day.     • aspirin 81 MG EC tablet Take 81 mg by mouth Daily.     • clonazePAM (KlonoPIN) 0.5 MG tablet Take 1 mg by mouth every night at bedtime.     • diphenhydrAMINE (BENADRYL) 25 mg capsule Take 25 mg by mouth every night at bedtime.     • lovastatin (MEVACOR) 10 MG tablet Take 10 mg by mouth Daily.     • metoprolol tartrate (LOPRESSOR) 25 MG tablet Take 1 tablet by mouth Daily. 30 tablet 3     No current facility-administered medications for this visit.        Patient has no known allergies.    Past Medical History:   Diagnosis Date   • Atrial  fibrillation (CMS/HCC)    • Insomnia    • Tinnitus     B ear ringing       Social History     Socioeconomic History   • Marital status:      Spouse name: Not on file   • Number of children: Not on file   • Years of education: Not on file   • Highest education level: Not on file   Tobacco Use   • Smoking status: Former Smoker     Packs/day: 1.00     Years: 10.00     Pack years: 10.00     Types: Cigarettes   • Smokeless tobacco: Former User     Types: Chew   Substance and Sexual Activity   • Alcohol use: No   • Drug use: No   • Sexual activity: Defer       Family History   Problem Relation Age of Onset   • Breast cancer Mother    • Arrhythmia Mother    • Mitral valve prolapse Mother    • No Known Problems Father        Review of Systems   Constitutional: Positive for fatigue (Increased fatigue ). Negative for chills and fever.   HENT: Negative.  Negative for congestion, rhinorrhea and sore throat.    Eyes: Positive for visual disturbance (Glasses daily ).   Respiratory: Negative for chest tightness and shortness of breath.    Cardiovascular: Positive for palpitations (Flutters ). Negative for chest pain and leg swelling.   Gastrointestinal: Positive for nausea (yesterday ). Negative for abdominal pain, blood in stool and vomiting.   Endocrine: Positive for cold intolerance (Hands feel cold ). Negative for heat intolerance.   Genitourinary: Negative.  Negative for dysuria, frequency, hematuria and urgency.   Musculoskeletal: Positive for arthralgias (Joints ), back pain (Lower back pain ) and neck pain.   Skin: Negative.  Negative for rash and wound.   Allergic/Immunologic: Negative.  Negative for environmental allergies and food allergies.   Neurological: Negative for dizziness and weakness.   Hematological: Bruises/bleeds easily (Bruises easily ).   Psychiatric/Behavioral: Positive for agitation (easily agitated ). Negative for confusion and sleep disturbance (Denies waking with smothering or SOA). The patient  "is not nervous/anxious.        Objective   Vitals:    08/08/19 1143   BP: 133/85   BP Location: Right arm   Pulse: 57   SpO2: 100%   Weight: 78 kg (172 lb)   Height: 182.9 cm (72.01\")      /85 (BP Location: Right arm)   Pulse 57   Ht 182.9 cm (72.01\")   Wt 78 kg (172 lb)   SpO2 100%   BMI 23.32 kg/m²    Lab Results (most recent)     None        Physical Exam   Constitutional: He is oriented to person, place, and time. He appears well-developed and well-nourished. No distress.   HENT:   Head: Normocephalic and atraumatic.   Eyes: EOM are normal. Pupils are equal, round, and reactive to light.   Neck: No JVD present.   Cardiovascular: Normal rate, regular rhythm, normal heart sounds and intact distal pulses. Exam reveals no gallop and no friction rub.   No murmur heard.  Pulmonary/Chest: Effort normal and breath sounds normal. No respiratory distress. He has no wheezes. He has no rales. He exhibits no tenderness.   Musculoskeletal: Normal range of motion. He exhibits no edema.   Neurological: He is alert and oriented to person, place, and time. No cranial nerve deficit.   Skin: Skin is warm and dry. No rash noted. No erythema. No pallor.   Psychiatric: He has a normal mood and affect. His behavior is normal.   Nursing note and vitals reviewed.        Procedure   Procedures       Assessment/Plan      Diagnosis Plan   1. Paroxysmal atrial fibrillation (CMS/HCC)     2. Shortness of breath     3. Palpitations       1.  The patient finally agrees that he has A. fib, all as outlined above.  He had recent hospitalization for the same.  His prior monitor had suggested that but he felt that that was likely a mistake by the event monitor.  Now he agrees that he does have A. fib.  He agrees that he will now continue Eliquis 5 mg twice daily for CVA risk reduction.    2.  I feel that he needs rhythm control at this time.  He was attempted on flecainide previously however felt that this worsened tinnitus.  He continues " to have tinnitus despite that therapy be and discontinued previously.  He agrees to rechallenge that.    3.  Before challenging that however I would want to know that his stress test and echocardiogram studies were unremarkable.  If those studies are benign, I feel that we can institute flecainide.  My concern for other antidysrhythmic therapy is because of his severe recurrent episodes of bradycardia in the past.  I am worried that quite a few of the antidysrhythmic therapies would exacerbate that.    4.  I would like to see him back on Monday of next week and we can start to schedule for all the above.  We can discuss timetable for institution of flecainide at that time.  By then, he will have been on anticoagulation a full 2 weeks.  He will call for any complications however prior to follow-up.           Mia Posadas is a current smokeless tobacco user.  He currently chews 5 chaws or dips of smokeless tobacco per day for a duration of 10 years. I have educated him on the risk of diseases from using tobacco products such as cancer, COPD and heart diease.     I advised him to quit and he is not willing to quit.     Patient's Body mass index is 23.32 kg/m². BMI is within normal parameters. No follow-up required..             Electronically signed by:

## 2019-08-09 ENCOUNTER — TELEPHONE (OUTPATIENT)
Dept: CARDIOLOGY | Facility: CLINIC | Age: 74
End: 2019-08-09

## 2019-08-09 DIAGNOSIS — I48.0 PAROXYSMAL ATRIAL FIBRILLATION (HCC): Primary | ICD-10-CM

## 2019-08-09 DIAGNOSIS — I35.1 AORTIC VALVE INSUFFICIENCY, ETIOLOGY OF CARDIAC VALVE DISEASE UNSPECIFIED: ICD-10-CM

## 2019-08-09 DIAGNOSIS — R06.02 SHORTNESS OF BREATH: ICD-10-CM

## 2019-08-09 DIAGNOSIS — R00.2 PALPITATIONS: ICD-10-CM

## 2019-08-09 DIAGNOSIS — I10 ESSENTIAL HYPERTENSION: ICD-10-CM

## 2019-08-09 NOTE — TELEPHONE ENCOUNTER
Verbal orders given from provider that this patient will need stress and echo prior to any medication changes being made. Therefore, this patient will not need appt for nurse visits on M,T, and W. Called patient and notified his as well. Patient was notified that I would place orders for testing , once results received we can make medication changes at that time. -;CARMELO

## 2019-08-26 ENCOUNTER — TELEPHONE (OUTPATIENT)
Dept: CARDIOLOGY | Facility: CLINIC | Age: 74
End: 2019-08-26

## 2019-08-26 NOTE — TELEPHONE ENCOUNTER
Patient calling to make provider aware of low heart rate he states his pulse has been 47-52 for the past 4 days and he is getting less sleep two hours less he believes since starting Eliquis.

## 2019-08-26 NOTE — TELEPHONE ENCOUNTER
Continue for now.  I do not believe that the Eliquis is suppressing heart rate to that degree.  If it continues however call we may have to consider alternate therapy.

## 2019-08-27 NOTE — TELEPHONE ENCOUNTER
Called patient after receiving message through in box on 08/26/2019. Patient stated he spoke with Bren yesterday. Patient had no concerns at this time. Notified to keep appt for cardiac testing as scheduled. -BONNIE;CARMELO

## 2019-09-04 ENCOUNTER — HOSPITAL ENCOUNTER (OUTPATIENT)
Dept: CARDIOLOGY | Facility: HOSPITAL | Age: 74
Discharge: HOME | End: 2019-09-04

## 2019-09-04 DIAGNOSIS — I48.0 PAROXYSMAL ATRIAL FIBRILLATION (HCC): ICD-10-CM

## 2019-09-04 DIAGNOSIS — R06.02 SHORTNESS OF BREATH: ICD-10-CM

## 2019-09-04 DIAGNOSIS — I10 ESSENTIAL HYPERTENSION: ICD-10-CM

## 2019-09-04 DIAGNOSIS — I35.1 AORTIC VALVE INSUFFICIENCY, ETIOLOGY OF CARDIAC VALVE DISEASE UNSPECIFIED: ICD-10-CM

## 2019-09-04 DIAGNOSIS — R00.2 PALPITATIONS: ICD-10-CM

## 2019-09-04 PROCEDURE — 93307 TTE W/O DOPPLER COMPLETE: CPT | Performed by: INTERNAL MEDICINE

## 2019-09-04 PROCEDURE — 0 TECHNETIUM SESTAMIBI: Performed by: INTERNAL MEDICINE

## 2019-09-04 PROCEDURE — A9500 TC99M SESTAMIBI: HCPCS | Performed by: INTERNAL MEDICINE

## 2019-09-04 PROCEDURE — 93306 TTE W/DOPPLER COMPLETE: CPT

## 2019-09-04 PROCEDURE — 78452 HT MUSCLE IMAGE SPECT MULT: CPT | Performed by: INTERNAL MEDICINE

## 2019-09-04 PROCEDURE — 78452 HT MUSCLE IMAGE SPECT MULT: CPT

## 2019-09-04 PROCEDURE — 93018 CV STRESS TEST I&R ONLY: CPT | Performed by: INTERNAL MEDICINE

## 2019-09-04 PROCEDURE — 93017 CV STRESS TEST TRACING ONLY: CPT

## 2019-09-04 PROCEDURE — 25010000002 REGADENOSON 0.4 MG/5ML SOLUTION: Performed by: INTERNAL MEDICINE

## 2019-09-04 RX ADMIN — REGADENOSON 0.4 MG: 0.08 INJECTION, SOLUTION INTRAVENOUS at 14:00

## 2019-09-04 RX ADMIN — TECHNETIUM TC 99M SESTAMIBI 1 DOSE: 1 INJECTION INTRAVENOUS at 11:39

## 2019-09-04 RX ADMIN — TECHNETIUM TC 99M SESTAMIBI 1 DOSE: 1 INJECTION INTRAVENOUS at 14:00

## 2019-09-08 LAB
BH CV STRESS COMMENTS STAGE 1: NORMAL
BH CV STRESS DOSE REGADENOSON STAGE 1: 0.4
BH CV STRESS DURATION MIN STAGE 1: 0
BH CV STRESS DURATION SEC STAGE 1: 10
BH CV STRESS PROTOCOL 1: NORMAL
BH CV STRESS RECOVERY BP: NORMAL MMHG
BH CV STRESS RECOVERY HR: 61 BPM
BH CV STRESS STAGE 1: 1
MAXIMAL PREDICTED HEART RATE: 148 BPM
PERCENT MAX PREDICTED HR: 39.86 %
STRESS BASELINE BP: NORMAL MMHG
STRESS BASELINE HR: 49 BPM
STRESS PERCENT HR: 47 %
STRESS POST PEAK BP: NORMAL MMHG
STRESS POST PEAK HR: 59 BPM
STRESS TARGET HR: 126 BPM

## 2019-09-10 LAB
BH CV ECHO MEAS - ACS: 2.3 CM
BH CV ECHO MEAS - AI DEC SLOPE: 154.1 CM/SEC^2
BH CV ECHO MEAS - AI MAX PG: 55.6 MMHG
BH CV ECHO MEAS - AI MAX VEL: 372.6 CM/SEC
BH CV ECHO MEAS - AI P1/2T: 708.4 MSEC
BH CV ECHO MEAS - AO MEAN PG: 5.4 MMHG
BH CV ECHO MEAS - AO ROOT AREA (BSA CORRECTED): 1.8
BH CV ECHO MEAS - AO ROOT AREA: 10.3 CM^2
BH CV ECHO MEAS - AO ROOT DIAM: 3.6 CM
BH CV ECHO MEAS - AO V2 MEAN: 111.4 CM/SEC
BH CV ECHO MEAS - AO V2 VTI: 34.4 CM
BH CV ECHO MEAS - BSA(HAYCOCK): 2 M^2
BH CV ECHO MEAS - BSA: 2 M^2
BH CV ECHO MEAS - BZI_BMI: 23.3 KILOGRAMS/M^2
BH CV ECHO MEAS - BZI_METRIC_HEIGHT: 182.9 CM
BH CV ECHO MEAS - BZI_METRIC_WEIGHT: 78 KG
BH CV ECHO MEAS - EDV(CUBED): 142.9 ML
BH CV ECHO MEAS - EDV(MOD-SP4): 124 ML
BH CV ECHO MEAS - EDV(TEICH): 131.1 ML
BH CV ECHO MEAS - EF(CUBED): 54.9 %
BH CV ECHO MEAS - EF(MOD-SP4): 53.2 %
BH CV ECHO MEAS - EF(TEICH): 46.3 %
BH CV ECHO MEAS - ESV(CUBED): 64.4 ML
BH CV ECHO MEAS - ESV(MOD-SP4): 58 ML
BH CV ECHO MEAS - ESV(TEICH): 70.3 ML
BH CV ECHO MEAS - FS: 23.3 %
BH CV ECHO MEAS - IVS/LVPW: 0.97
BH CV ECHO MEAS - IVSD: 1.1 CM
BH CV ECHO MEAS - LA DIMENSION: 4.2 CM
BH CV ECHO MEAS - LA/AO: 1.1
BH CV ECHO MEAS - LV DIASTOLIC VOL/BSA (35-75): 62.1 ML/M^2
BH CV ECHO MEAS - LV IVRT: 0.11 SEC
BH CV ECHO MEAS - LV MASS(C)D: 227 GRAMS
BH CV ECHO MEAS - LV MASS(C)DI: 113.6 GRAMS/M^2
BH CV ECHO MEAS - LV SYSTOLIC VOL/BSA (12-30): 29 ML/M^2
BH CV ECHO MEAS - LVIDD: 5.2 CM
BH CV ECHO MEAS - LVIDS: 4 CM
BH CV ECHO MEAS - LVLD AP4: 7.6 CM
BH CV ECHO MEAS - LVLS AP4: 7.2 CM
BH CV ECHO MEAS - LVOT AREA (M): 4.5 CM^2
BH CV ECHO MEAS - LVOT AREA: 4.4 CM^2
BH CV ECHO MEAS - LVOT DIAM: 2.4 CM
BH CV ECHO MEAS - LVPWD: 1.1 CM
BH CV ECHO MEAS - MV A MAX VEL: 72.1 CM/SEC
BH CV ECHO MEAS - MV DEC SLOPE: 270.2 CM/SEC^2
BH CV ECHO MEAS - MV E MAX VEL: 65.6 CM/SEC
BH CV ECHO MEAS - MV E/A: 0.91
BH CV ECHO MEAS - RAP SYSTOLE: 10 MMHG
BH CV ECHO MEAS - RVDD: 3.5 CM
BH CV ECHO MEAS - RVSP: 33.1 MMHG
BH CV ECHO MEAS - SI(AO): 177.8 ML/M^2
BH CV ECHO MEAS - SI(CUBED): 39.3 ML/M^2
BH CV ECHO MEAS - SI(MOD-SP4): 33 ML/M^2
BH CV ECHO MEAS - SI(TEICH): 30.4 ML/M^2
BH CV ECHO MEAS - SV(AO): 355.4 ML
BH CV ECHO MEAS - SV(CUBED): 78.5 ML
BH CV ECHO MEAS - SV(MOD-SP4): 66 ML
BH CV ECHO MEAS - SV(TEICH): 60.8 ML
BH CV ECHO MEAS - TR MAX VEL: 240.3 CM/SEC

## 2019-09-11 ENCOUNTER — TELEPHONE (OUTPATIENT)
Dept: CARDIOLOGY | Facility: CLINIC | Age: 74
End: 2019-09-11

## 2019-09-11 NOTE — TELEPHONE ENCOUNTER
Called patient and reviewed stress and echo results, notified to start Sotalol 80 mg BID on Marco 09/15/2019, come in on MT W for nurse visit with EKG. Patient stated he is not taking Metoprolol. Patient verbalized understanding and had no further questions at this time. Patient was transferred to [place] to schedule nurse visit. -;[facility]  Result Notes for Adult Transthoracic Echo Complete W/ Cont if Necessary Per Protocol     Notes recorded by Ethan Garcia PA on 9/11/2019 at 5:07 PM EDT  We likely will consider sotalol given mild reduction in systolic function as above.   Adult Transthoracic Echo Complete W/ Cont if Necessary Per Protocol   Order: [id number]   Status:  Final result   Visible to patient:  Yes (MyChart) Dx:  Palpitations; Shortness of breath; Pa...   Details     Reading Physician Reading Date Result Priority   Dwain Kennedy MD 9/4/2019 Routine      Result Text     1.  The left ventricle is borderline dilated global LV systolic function is mildly depressed with mild lateral wall hypokinesis.  Grade 1A diastolic dysfunction.  Mild left atrial enlargement.  Right ventricle is grossly normal.  There is a Chiari network is identified in the right atrium.     2.  The mitral valve is morphologically normal with no mitral stenosis and mild mitral regurgitation.  The noncoronary cusp of the aortic valve is sclerotic, thickened, and mildly calcified.  There is no aortic stenosis there is moderate aortic insufficiency directed against the anterior mitral leaflet.  Mild tricuspid regurgitation.     3.  There is no pericardial effusion present.  The proximal aortic root is mildly dilated at 3.9 cm with no dissection or aneurysm.  The IVC is not distended.     4.  Pulmonary artery systolic pressures are estimated at approximately 40 mmHg.

## 2019-09-12 NOTE — TELEPHONE ENCOUNTER
Called patient and verified he was aware his nurse visits are to be scheduled for next week on M,T,W. Patient verbalized understanding stated he was aware. Again, I reviewed stress and echo test results as well as Sotalol medication. -BONNIE:CARMELO

## 2019-09-16 ENCOUNTER — TELEPHONE (OUTPATIENT)
Dept: CARDIOLOGY | Facility: CLINIC | Age: 74
End: 2019-09-16

## 2019-09-16 NOTE — TELEPHONE ENCOUNTER
Called patient, he stated he could not take the Sotalol 80 mg BID due to it causing bradycardia. This was discussed with provider Ethan Garcia PA-C, verbal orders were given for patient to decrease to 40 mg BID and keep nurse visit appts for Tuesday and Wednesday.   Called patient and notified him of verbal orders , patient verbalized understanding and had no further questions at this time. -;CARMELO

## 2019-09-16 NOTE — TELEPHONE ENCOUNTER
----- Message from Jose Chang sent at 9/16/2019  8:49 AM EDT -----  Regarding: UNABLE TO TOLERATE RX: SOTALOL  PT CANCELLED EKG BECAUSE HE CAN NOT TOLERATE THE RX: SOTALOL. STATES MAKING HIS HR DROP TO LOW.

## 2019-09-17 ENCOUNTER — CLINICAL SUPPORT (OUTPATIENT)
Dept: CARDIOLOGY | Facility: CLINIC | Age: 74
End: 2019-09-17

## 2019-09-17 VITALS
BODY MASS INDEX: 23.35 KG/M2 | DIASTOLIC BLOOD PRESSURE: 83 MMHG | OXYGEN SATURATION: 98 % | HEIGHT: 72 IN | HEART RATE: 58 BPM | WEIGHT: 172.4 LBS | SYSTOLIC BLOOD PRESSURE: 127 MMHG

## 2019-09-17 DIAGNOSIS — I48.0 PAROXYSMAL ATRIAL FIBRILLATION (HCC): Primary | ICD-10-CM

## 2019-09-17 PROCEDURE — 93000 ELECTROCARDIOGRAM COMPLETE: CPT | Performed by: PHYSICIAN ASSISTANT

## 2019-09-17 NOTE — PROGRESS NOTES
Mia Posadas  1945 9/17/2019   ?   Chief Complaint   Patient presents with   • NURSE VISIT     PATIENT APPEARS IN OFFICE TODAY FOR NURSE VISIT WITH EKG AFTER STARTING SOTALOL      ?   HPI:     PATIENT APPEARS IN OFFICE TODAY FOR NURSE VISIT WITH EKG AFTER STARTING SOTALOL. -BH;[facility]MA  ?   ?   ?     Current Outpatient Medications:   •  amitriptyline (ELAVIL) 25 MG tablet, Take 25 mg by mouth Every Night., Disp: , Rfl:   •  apixaban (ELIQUIS) 5 MG tablet tablet, Take 5 mg by mouth 2 (Two) Times a Day., Disp: , Rfl:   •  aspirin 81 MG EC tablet, Take 81 mg by mouth Daily., Disp: , Rfl:   •  clonazePAM (KlonoPIN) 0.5 MG tablet, Take 1 mg by mouth every night at bedtime., Disp: , Rfl:   •  diphenhydrAMINE (BENADRYL) 25 mg capsule, Take 25 mg by mouth every night at bedtime., Disp: , Rfl:   •  lovastatin (MEVACOR) 10 MG tablet, Take 10 mg by mouth Daily., Disp: , Rfl:   •  Sotalol HCl AF 80 MG tablet, Take 1 tablet by mouth 2 (Two) Times a Day. (Patient taking differently: Take 40 mg by mouth 2 (Two) Times a Day.), Disp: 60 tablet, Rfl: 11   ?   ?   Patient has no known allergies.         ECG 12 Lead  Date/Time: 9/17/2019 9:18 AM  Performed by: Shell Garcia PA  Authorized by: Shell Garcia PA   Comparison: compared with previous ECG from 9/4/2019  Comments: A-FIB             ?   Assessment/Plan      1. A-FIB    PATIENT APPEARED IN OFFICE TODAY FOR NURSE VISIT WITH EKG PER VERBAL ORDER SHELL GARCIA PA-C. PATIENT WAS STARTED ON SOTALOL 80 MG BID ON 09/15/2019, PATIENT STATED HE TOOK 2 DOSES THAT DAY AND IT CAUSE SIGNIFICANT WEAKNESS/FATIGUED/BRADYCARDIA. PATIENT CALLED OFFICE ON 09/16/2019 AND CANCELLED NURSE VISIT WITH EKG BECAUSE HE STOPPED THE SOTALOL DUE TO COMPLICATIONS. ALL WAS DISCUSSED WITH PROVIDER SHELL GARCIA PA-C AND PATIENT WAS NOTIFIED ON 09/16/2019 TO DECREASE SOTALOL 40 MG BID. PATIENT TOOK 1 DOSE LAST PM AND 1 DOSE THIS AM, HE IS TOLERATING THIS MEDICATION AT THIS TIME. PATIENTS EKG WAS  PERFORMED AND SHOWN TO PROVIDER, PATIENT HAD QUESTIONS ABOUT MEDICATIONS. PROVIDER ENTERED ROOM AS WELL, REVIEWED EKG AND ANSWERED PATIENTS QUESTIONS AT THIS TIME. PATIENT WAS NOTIFIED OF NO NEW VERBAL ORDERS, NOTIFIED TO KEEP NURSE VISIT WITH EKG FOR NEXT 2 DAYS AS WELL. PATIENT VERBALIZED UNDERSTANDING AND HAD NO FURTHER QUESTIONS AT THIS TIME. -BONNIE;CARMELO  ?   ?   ?

## 2019-09-18 ENCOUNTER — CLINICAL SUPPORT (OUTPATIENT)
Dept: CARDIOLOGY | Facility: CLINIC | Age: 74
End: 2019-09-18

## 2019-09-18 VITALS
SYSTOLIC BLOOD PRESSURE: 124 MMHG | HEIGHT: 72 IN | DIASTOLIC BLOOD PRESSURE: 87 MMHG | HEART RATE: 62 BPM | WEIGHT: 171 LBS | BODY MASS INDEX: 23.16 KG/M2

## 2019-09-18 DIAGNOSIS — I48.0 PAROXYSMAL ATRIAL FIBRILLATION (HCC): Primary | ICD-10-CM

## 2019-09-18 PROCEDURE — 93000 ELECTROCARDIOGRAM COMPLETE: CPT | Performed by: PHYSICIAN ASSISTANT

## 2019-09-18 NOTE — PROGRESS NOTES
Mia Katharina  1945 9/18/2019   ?   Chief Complaint   Patient presents with   • Nurse visit     Sotalol repeat ekg      ?   HPI:   ? Patient presents for nurse visit, repeat ekg for Sotalol 40 mg bid.  ?   ?     Current Outpatient Medications:   •  amitriptyline (ELAVIL) 25 MG tablet, Take 25 mg by mouth Every Night., Disp: , Rfl:   •  apixaban (ELIQUIS) 5 MG tablet tablet, Take 5 mg by mouth 2 (Two) Times a Day., Disp: , Rfl:   •  aspirin 81 MG EC tablet, Take 81 mg by mouth Daily., Disp: , Rfl:   •  clonazePAM (KlonoPIN) 0.5 MG tablet, Take 1 mg by mouth every night at bedtime., Disp: , Rfl:   •  diphenhydrAMINE (BENADRYL) 25 mg capsule, Take 25 mg by mouth every night at bedtime., Disp: , Rfl:   •  lovastatin (MEVACOR) 10 MG tablet, Take 10 mg by mouth Daily., Disp: , Rfl:   •  Sotalol HCl AF 80 MG tablet, Take 1 tablet by mouth 2 (Two) Times a Day. (Patient taking differently: Take 40 mg by mouth 2 (Two) Times a Day.), Disp: 60 tablet, Rfl: 11   ?   ?   Patient has no known allergies.         ECG 12 Lead  Date/Time: 9/18/2019 1:03 PM  Performed by: Ethan Garcia PA  Authorized by: Ethan Garcia PA   Comparison: compared with previous ECG from 9/17/2019               ?   Assessment/Plan      1. Paroxysmal atrial fibrillation   ?   Patient presented in office today for repeat EKG for Sotalol therapy. Patient had no complaints. Per Ethan Garcia PAC, patient to continue current Sotalol dosage, 40 mg bid, return to office tomorrow for repeat ekg. Patient verbalized understanding, appointment made by front staff for ekg tomorrow. MO LPN  ?   ?

## 2019-09-19 ENCOUNTER — CLINICAL SUPPORT (OUTPATIENT)
Dept: CARDIOLOGY | Facility: CLINIC | Age: 74
End: 2019-09-19

## 2019-09-19 VITALS
BODY MASS INDEX: 23.22 KG/M2 | DIASTOLIC BLOOD PRESSURE: 86 MMHG | WEIGHT: 171.4 LBS | SYSTOLIC BLOOD PRESSURE: 137 MMHG | HEART RATE: 52 BPM | HEIGHT: 72 IN | OXYGEN SATURATION: 92 %

## 2019-09-19 DIAGNOSIS — I48.0 PAROXYSMAL ATRIAL FIBRILLATION (HCC): Primary | ICD-10-CM

## 2019-09-19 PROCEDURE — 93000 ELECTROCARDIOGRAM COMPLETE: CPT | Performed by: PHYSICIAN ASSISTANT

## 2019-09-23 ENCOUNTER — TELEPHONE (OUTPATIENT)
Dept: CARDIOLOGY | Facility: CLINIC | Age: 74
End: 2019-09-23

## 2019-09-23 DIAGNOSIS — I48.0 PAROXYSMAL ATRIAL FIBRILLATION (HCC): Primary | ICD-10-CM

## 2019-09-23 NOTE — TELEPHONE ENCOUNTER
Regarding: FW: Non-Urgent Medical Question  Contact: [phone]  Please forward this patient on to EP.  Thank you.  ----- Message -----  From: Enedina Parham LPN  Sent: 9/23/2019   4:08 PM  To: YOSELIN Pelletier  Subject: FW: Non-Urgent Medical Question                  Per Ethan Garcia PAC patient to stop Sotalol and start Metoprolol 25 mg 1/2 tab po bid, related to patient having fatigue and low heart rate. Enedina Parham LPN       ----- Message -----  From: Mia Posadas  Sent: 9/23/2019   1:50 PM  To: Otoniel Rasmussen [facility]  Subject: Non-Urgent Medical Question                      ----- Message from Mychart, Generic sent at 9/23/2019  1:50 PM EDT -----    Something must be changed as I cannot continue with o- no energy, HR for the most part is around 50 sometimes upper 40s it a task to get to 65 than when sitting down or being at a slow gate it drops back down, have no chest pain or trouble breathing just feel like I've been really physically busy and tired, it's worse than when I was in afib ( the tiredness) let me know what to do.

## 2020-01-09 NOTE — PROGRESS NOTES
Electrophysiology Clinic Consult     Mia Posadas  1945  [unfilled]  [unfilled]    01/10/20    DATE OF ADMISSION: (Not on file)  [facility] CARDIOLOGY    Jacqueline Rader, APRN  [address] / [facility] [address]    Chief Complaint   Patient presents with   • PAF     CONSULT        Problem List:  1. Paroxysmal Atrial Fibrillation  a. CHADSvasc= 1 (Age above 65) on Eliquis  b. Diagnosed by event monitor 11/2017  c. Presentation to ED 7/2019 for symptomatic atrial fibrillation with RVR  d. Echocardiogram 9/4/2019: EF 46 to 50%, LV borderline dilated globally with mild lateral wall hypokinesis, Chiari network identified in the right atrium, moderate AI, mild TR  e. Stress Test 9/4/19: no ischemia, EF 50%  f. Failed Flecanide due to side effects (worsened tinnitus)  g. initiated on Sotalol 9/2019, decreased due to side effects of weakness, bradycardia, fatigue  2. Insomnia  3. Tinnitus  4. Previous tobacco abuse  5. History of negative sleep study      Past Surgical History:   Procedure Laterality Date   • KIDNEY STONE SURGERY       HPI:   The patient is a 74-year-old white male with above-stated past medical history who is referred to our care by Ethan Garcia PA-C for further evaluation of atrial fibrillation.  He was diagnosed with atrial fibrillation in 2017 but was apparently asymptomatic and deferred treatment at that time.  In July 2019 he presented to [facility] with symptomatic palpitations and was found to be in atrial fibrillation with RVR.  He was given IV rate control medication and converted to normal sinus rhythm. He thinks the entire episode was approximately 12 hours. He was also initiated on Eliquis at that time.  Had a stress test and echocardiogram since then showing mild LV dysfunction 46 to 50% with no ischemia on stress test.  He was started on Sotalol which has caused him to have side effects of weakness bradycardia and  fatigue.  He was taken off Sotalol and feels better. He still states that he is tired as he has insomnia, he has trouble falling asleep and staying asleep. He was tested for FRANKY and it was negative. He denies CP, CP, syncope, dizzy. He denies smoking tobacco but does chew tobacco. He denies ETOH. He drinks 30 oz of coffee every day. 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       Allergies   Allergen Reactions   • Sotalol Other (See Comments)     BRADYCARDIA           Cannot display prior to admission medications because the patient has not been admitted in this contact.            Current Outpatient Medications:   •  acetaminophen (TYLENOL) 500 MG tablet, Take 500 mg by mouth Every 6 (Six) Hours As Needed for Mild Pain ., Disp: , Rfl:   •  amitriptyline (ELAVIL) 25 MG tablet, Take 25 mg by mouth As Needed., Disp: , Rfl:   •  apixaban (ELIQUIS) 5 MG tablet tablet, Take 5 mg by mouth 2 (Two) Times a Day., Disp: , Rfl:   •  aspirin 81 MG EC tablet, Take 81 mg by mouth Daily., Disp: , Rfl:   •  clonazePAM (KlonoPIN) 0.5 MG tablet, Take 1 mg by mouth every night at bedtime., Disp: , Rfl:   •  diphenhydrAMINE (BENADRYL) 25 mg capsule, Take 25 mg by mouth every night at bedtime., Disp: , Rfl:   •  lovastatin (MEVACOR) 10 MG tablet, Take 20 mg by mouth Daily., Disp: , Rfl:   •  metoprolol tartrate (LOPRESSOR) 25 MG tablet, Take 0.5 tablets by mouth 2 (Two) Times a Day., Disp: 30 tablet, Rfl: 3    Social History     Socioeconomic History   • Marital status:      Spouse name: Not on file   • Number of children: Not on file   • Years of education: Not on file   • Highest education level: Not on file   Tobacco Use   • Smoking status: Former Smoker     Packs/day: 1.00     Years: 10.00     Pack years: 10.00     Types: Cigarettes   • Smokeless tobacco: Former User     Types: Chew   Substance and Sexual Activity   • Alcohol use: No   • Drug use: No   • Sexual activity: Defer       Family History   Problem  "Relation Age of Onset   • Breast cancer Mother    • Arrhythmia Mother    • Mitral valve prolapse Mother    • No Known Problems Father        REVIEW OF SYSTEMS:   CONST:  No weight loss, fever, chills, weakness or fatigue.   HEENT:  No visual loss, blurred vision, double vision, yellow sclerae.                   No hearing loss, congestion, sore throat.   SKIN:      No rashes, urticaria, ulcers, sores.     RESP:     No shortness of breath, hemoptysis, cough, sputum.   GI:           No anorexia, nausea, vomiting, diarrhea. No abdominal pain, melena.   :         No burning on urination, hematuria or increased frequency.  ENDO:    No diaphoresis, cold or heat intolerance. No polyuria or polydipsia.   NEURO:  No headache, dizziness, syncope, paralysis, ataxia, or parasthesias.                  No change in bowel or bladder control. No history of CVA/TIA  MUSC:    No muscle, back pain, joint pain or stiffness.   HEME:    No anemia, bleeding, bruising. No history of DVT/PE.  PSYCH:  No history of depression, anxiety    Vitals:    01/10/20 1249   BP: 126/78   BP Location: Left arm   Patient Position: Sitting   Pulse: 59   Weight: 77.1 kg (170 lb)   Height: 180.3 cm (71\")                 Physical Exam:  GEN: Well nourished, well-developed, no acute distress  HEENT: Normocephalic, atraumatic, PERRLA, moist mucous membranes  NECK: Supple, NO JVD, no thyromegaly, no lymphadenopathy  CARD: S1S2, RRR, no murmur, gallop, rub, PMI NL  LUNGS: Clear to auscultation, normal respiratory effort  ABDOMEN: Soft, nontender, normal bowel sounds  EXTREMITIES: No gross deformities, no clubbing, cyanosis, or edema  SKIN: Warm, dry, no lesions  NEURO: No focal deficits, alert and oriented x 3  PSYCHIATRIC: Normal affect and mood      I personally viewed and interpreted the patient's EKG/Telemetry/lab data    No results found for: GLUCOSE, CALCIUM, NA, K, CO2, CL, BUN, CREATININE, EGFRIFAFRI, EGFRIFNONA, BCR, ANIONGAP  No results found for: " WBC, HGB, HCT, MCV, PLT  No results found for: INR, PROTIME  No results found for: TSH, J8AZMFF, A4SOSMS, THYROIDAB          ECG 12 Lead  Date/Time: 1/10/2020 12:57 PM  Performed by: Celso Hsu MD  Authorized by: Celso Hsu MD   Comparison: compared with previous ECG from 7/26/2019  Comparison to previous ECG: Now in NSR  Rhythm: sinus rhythm and sinus bradycardia  BPM: 59                ICD-10-CM ICD-9-CM   1. Paroxysmal atrial fibrillation (CMS/HCC) I48.0 427.31       Assessment and Plan:     1. Paroxysmal Atrial Fibrillation:  - he has had side effects to Flecanide in the past and recently with Sotalol. His episodes are infrequent, has had only one major episode. At this time, would not attempt PVA or offer alternate antiarrythmics, but certainly could be pursued if he has more frequent episodes or worsening symptoms.   - CHADsvasc = 1, will be score of 2 when he turns 76. Continue eliquis      Scribed for Celso Hsu MD by Tita Murphy PA-C. 1/10/2020  1:39 PM     ICelso MD, personally performed the services described in this documentation as scribed by the above named individual in my presence, and it is both accurate and complete.  1/10/2020  1:40 PM

## 2020-01-10 ENCOUNTER — CONSULT (OUTPATIENT)
Dept: CARDIOLOGY | Facility: CLINIC | Age: 75
End: 2020-01-10

## 2020-01-10 VITALS
SYSTOLIC BLOOD PRESSURE: 126 MMHG | BODY MASS INDEX: 23.8 KG/M2 | HEIGHT: 71 IN | WEIGHT: 170 LBS | HEART RATE: 59 BPM | DIASTOLIC BLOOD PRESSURE: 78 MMHG

## 2020-01-10 DIAGNOSIS — I48.0 PAROXYSMAL ATRIAL FIBRILLATION (HCC): Primary | ICD-10-CM

## 2020-01-10 PROCEDURE — 99204 OFFICE O/P NEW MOD 45 MIN: CPT | Performed by: INTERNAL MEDICINE

## 2020-01-10 PROCEDURE — 93000 ELECTROCARDIOGRAM COMPLETE: CPT | Performed by: INTERNAL MEDICINE

## 2020-01-10 RX ORDER — ACETAMINOPHEN 500 MG
500 TABLET ORAL EVERY 6 HOURS PRN
COMMUNITY

## 2020-07-14 ENCOUNTER — OFFICE VISIT (OUTPATIENT)
Dept: CARDIOLOGY | Facility: CLINIC | Age: 75
End: 2020-07-14

## 2020-07-14 VITALS
OXYGEN SATURATION: 94 % | BODY MASS INDEX: 25.11 KG/M2 | SYSTOLIC BLOOD PRESSURE: 132 MMHG | HEIGHT: 71 IN | HEART RATE: 54 BPM | TEMPERATURE: 98 F | WEIGHT: 179.4 LBS | DIASTOLIC BLOOD PRESSURE: 85 MMHG

## 2020-07-14 DIAGNOSIS — I10 ESSENTIAL HYPERTENSION: ICD-10-CM

## 2020-07-14 DIAGNOSIS — I48.0 PAROXYSMAL ATRIAL FIBRILLATION (HCC): Primary | ICD-10-CM

## 2020-07-14 DIAGNOSIS — R06.02 SHORTNESS OF BREATH: ICD-10-CM

## 2020-07-14 PROCEDURE — 99213 OFFICE O/P EST LOW 20 MIN: CPT | Performed by: PHYSICIAN ASSISTANT

## 2020-07-14 NOTE — PROGRESS NOTES
Problem list     Subjective   Mia Posadas is a 74 y.o. male     Chief Complaint   Patient presents with   • Atrial Fibrillation     presents for 6 month f/u   • Palpitations   Problem list:  1.  Paroxysmal atrial fibrillation.  1.1.  Managed currently with rate control and anticoagulation therapy  1.2.  Failure previously with flecainide therapy.  2.  Hypertension  3.  Low risk nuclear stress test, 9/2019.  4.  Low normal systolic function, estimated EF at 50%.    HPI  The patient presents in today for routine evaluation and follow-up.  Since last evaluation, the patient feels that he has done fairly well from cardiovascular standpoint.  He still has episodes of A. fib at times.  These tend to be short-lived and largely nonproblematic.  We have discussed re-challenging antidysrhythmic therapy.  He did not do well with flecainide therapy.  He has had bradycardia otherwise with other medications.  He feels that he is doing fairly well which is low-dose rate control medications and would prefer not to change that for now.  Otherwise, the patient has no chest pain.  He has stable dyspnea.  He has fatigue at times.  He has no failure or dysrhythmic symptoms otherwise.  He has no further complaints.    Current Outpatient Medications on File Prior to Visit   Medication Sig Dispense Refill   • acetaminophen (TYLENOL) 500 MG tablet Take 500 mg by mouth Every 6 (Six) Hours As Needed for Mild Pain .     • amitriptyline (ELAVIL) 25 MG tablet Take 25 mg by mouth As Needed.     • apixaban (ELIQUIS) 5 MG tablet tablet Take 5 mg by mouth 2 (Two) Times a Day.     • aspirin 81 MG EC tablet Take 81 mg by mouth Daily.     • clonazePAM (KlonoPIN) 0.5 MG tablet Take 1 mg by mouth every night at bedtime.     • diphenhydrAMINE (BENADRYL) 25 mg capsule Take 25 mg by mouth every night at bedtime.     • lovastatin (MEVACOR) 10 MG tablet Take 20 mg by mouth Daily.     • Melatonin 3 MG capsule Take  by mouth As Needed.       No current  facility-administered medications on file prior to visit.        Sotalol    Past Medical History:   Diagnosis Date   • Atrial fibrillation (CMS/HCC)    • Insomnia    • Tinnitus     B ear ringing       Social History     Socioeconomic History   • Marital status:      Spouse name: Not on file   • Number of children: Not on file   • Years of education: Not on file   • Highest education level: Not on file   Tobacco Use   • Smoking status: Former Smoker     Packs/day: 1.00     Years: 10.00     Pack years: 10.00     Types: Cigarettes   • Smokeless tobacco: Former User     Types: Chew   Substance and Sexual Activity   • Alcohol use: No   • Drug use: No   • Sexual activity: Defer       Family History   Problem Relation Age of Onset   • Breast cancer Mother    • Arrhythmia Mother    • Mitral valve prolapse Mother    • No Known Problems Father        Review of Systems   Constitutional: Positive for fatigue. Negative for chills, diaphoresis and fever.   HENT: Positive for tinnitus.    Eyes: Positive for visual disturbance.   Respiratory: Positive for shortness of breath (on exertion). Negative for apnea, cough, chest tightness and wheezing.    Cardiovascular: Positive for palpitations (occas racing usually after eating   ). Negative for chest pain and leg swelling.   Gastrointestinal: Negative.  Negative for abdominal pain, blood in stool, constipation, diarrhea, nausea and vomiting.   Endocrine: Positive for heat intolerance.   Genitourinary: Negative.  Negative for hematuria.   Musculoskeletal: Positive for arthralgias, back pain and neck pain. Negative for myalgias.   Skin: Negative.  Negative for rash and wound.   Allergic/Immunologic: Negative.  Negative for environmental allergies and food allergies.   Neurological: Negative.  Negative for dizziness, syncope, weakness, light-headedness, numbness and headaches.   Hematological: Bruises/bleeds easily.   Psychiatric/Behavioral: Negative.  Negative for agitation and  "sleep disturbance. The patient is not nervous/anxious.        Objective   Vitals:    07/14/20 1127   BP: 132/85   BP Location: Left arm   Patient Position: Sitting   Pulse: 54   Temp: 98 °F (36.7 °C)   SpO2: 94%   Weight: 81.4 kg (179 lb 6.4 oz)   Height: 180.3 cm (70.98\")      /85 (BP Location: Left arm, Patient Position: Sitting)   Pulse 54   Temp 98 °F (36.7 °C)   Ht 180.3 cm (70.98\")   Wt 81.4 kg (179 lb 6.4 oz)   SpO2 94%   BMI 25.03 kg/m²    Lab Results (most recent)     None        Physical Exam   Constitutional: He is oriented to person, place, and time. He appears well-developed and well-nourished. No distress.   HENT:   Head: Normocephalic and atraumatic.   Eyes: Pupils are equal, round, and reactive to light. EOM are normal.   Neck: No JVD present.   Cardiovascular: Normal rate, regular rhythm, normal heart sounds and intact distal pulses. Exam reveals no gallop and no friction rub.   No murmur heard.  Pulmonary/Chest: Effort normal and breath sounds normal. No respiratory distress. He has no wheezes. He has no rales. He exhibits no tenderness.   Musculoskeletal: Normal range of motion. He exhibits no edema.   Neurological: He is alert and oriented to person, place, and time. No cranial nerve deficit.   Skin: Skin is warm and dry. No rash noted. No erythema. No pallor.   Psychiatric: He has a normal mood and affect. His behavior is normal.   Nursing note and vitals reviewed.        Procedure   Procedures       Assessment/Plan      Diagnosis Plan   1. Paroxysmal atrial fibrillation (CMS/HCC)     2. Essential hypertension     3. Shortness of breath       1.  At this time, the patient appears to be fairly stable from cardiovascular standpoint.  A. fib appears very much stable and fairly well treated for now with low-dose rate control and anticoagulation therapy.  I would make no change at this time.    2.  Blood pressures remain normotensive.  I will continue antihypertensive therapy without " change.  He will monitor blood pressures closely at home and call to us for any issues.    3.  Previous stress test indicated no evidence of ischemia.  Echocardiogram suggested low normal systolic function.  Work-up otherwise has been benign.  I do not feel further work-up is warranted at this time.    4.  The patient will call to us for any issues.  Otherwise, we will continue medical regimen without change and anticipate seeing him again on 6-month intervals.           Mia Posadas  reports that he has quit smoking. His smoking use included cigarettes. He has a 10.00 pack-year smoking history. He has quit using smokeless tobacco.  His smokeless tobacco use included chew.        Patient's Body mass index is 25.03 kg/m². BMI is within normal parameters. No follow-up required..             Electronically signed by:

## 2020-07-23 NOTE — TELEPHONE ENCOUNTER
Call from patient, states he wants to change pharmacy to [facility] for Metoprolol. Rx sent.  GABRIEL MOE

## 2021-03-23 ENCOUNTER — OFFICE VISIT (OUTPATIENT)
Dept: CARDIOLOGY | Facility: CLINIC | Age: 76
End: 2021-03-23

## 2021-03-23 VITALS
DIASTOLIC BLOOD PRESSURE: 80 MMHG | HEIGHT: 71 IN | OXYGEN SATURATION: 95 % | WEIGHT: 185.4 LBS | BODY MASS INDEX: 25.96 KG/M2 | HEART RATE: 64 BPM | SYSTOLIC BLOOD PRESSURE: 127 MMHG

## 2021-03-23 DIAGNOSIS — R00.2 PALPITATIONS: ICD-10-CM

## 2021-03-23 DIAGNOSIS — I10 ESSENTIAL HYPERTENSION: ICD-10-CM

## 2021-03-23 DIAGNOSIS — I48.0 PAROXYSMAL ATRIAL FIBRILLATION (HCC): Primary | ICD-10-CM

## 2021-03-23 PROCEDURE — 99213 OFFICE O/P EST LOW 20 MIN: CPT | Performed by: PHYSICIAN ASSISTANT

## 2021-03-23 PROCEDURE — 93000 ELECTROCARDIOGRAM COMPLETE: CPT | Performed by: PHYSICIAN ASSISTANT

## 2021-03-23 NOTE — PROGRESS NOTES
Problem list     Subjective   Mia Posadas is a 75 y.o. male     Chief Complaint   Patient presents with   • Atrial Fibrillation     presents for 6 month f/u   • Palpitations   • Fatigue   Problem list:  1.  Paroxysmal atrial fibrillation.  1.1.  Managed currently with rate control and anticoagulation therapy  1.2.  Failure previously with flecainide therapy.  2.  Hypertension  3.  Low risk nuclear stress test, 9/2019.  4.  Low normal systolic function, estimated EF at 50%.    HPI  This pleasant gentleman presents back today for review.  We have seen him historically because of A. fib as above.  The patient was not able to tolerate flecainide previously and apparently had issues with sotalol by report.  He has now been maintained with rate control and anticoagulation therapy.  He does well on that regimen.  He reports no real issues with A. fib since his last evaluation here.  He has had no chest pain.  He has stable dyspnea.  He has no failure or further dysrhythmic symptoms.  He is tolerating medications without complication.  He has no further complaints at this time.    Current Outpatient Medications on File Prior to Visit   Medication Sig Dispense Refill   • acetaminophen (TYLENOL) 500 MG tablet Take 500 mg by mouth Every 6 (Six) Hours As Needed for Mild Pain .     • amitriptyline (ELAVIL) 25 MG tablet Take 25 mg by mouth As Needed.     • apixaban (ELIQUIS) 5 MG tablet tablet Take 5 mg by mouth 2 (Two) Times a Day.     • aspirin 81 MG EC tablet Take 81 mg by mouth Daily.     • clonazePAM (KlonoPIN) 0.5 MG tablet Take 1 mg by mouth every night at bedtime.     • diphenhydrAMINE (BENADRYL) 25 mg capsule Take 25 mg by mouth every night at bedtime.     • lovastatin (MEVACOR) 10 MG tablet Take 20 mg by mouth Daily.     • Melatonin 3 MG capsule Take  by mouth As Needed.     • metoprolol tartrate (LOPRESSOR) 25 MG tablet Take 0.5 tablets by mouth 2 (Two) Times a Day. 90 tablet 3     No current facility-administered  "medications on file prior to visit.       Sotalol    Past Medical History:   Diagnosis Date   • Atrial fibrillation (CMS/HCC)    • Insomnia    • Tinnitus     B ear ringing       Social History     Socioeconomic History   • Marital status:      Spouse name: Not on file   • Number of children: Not on file   • Years of education: Not on file   • Highest education level: Not on file   Tobacco Use   • Smoking status: Former Smoker     Packs/day: 1.00     Years: 10.00     Pack years: 10.00     Types: Cigarettes   • Smokeless tobacco: Former User     Types: Chew   Substance and Sexual Activity   • Alcohol use: No   • Drug use: No   • Sexual activity: Defer       Family History   Problem Relation Age of Onset   • Breast cancer Mother    • Arrhythmia Mother    • Mitral valve prolapse Mother    • No Known Problems Father        Review of Systems   Constitutional: Positive for fatigue. Negative for chills, diaphoresis and fever.   HENT: Positive for tinnitus.    Eyes: Negative.    Respiratory: Positive for shortness of breath (on exertion). Negative for apnea, choking, chest tightness and wheezing.    Cardiovascular: Positive for palpitations (occas racing). Negative for chest pain and leg swelling.   Gastrointestinal: Negative.    Endocrine: Negative.    Genitourinary: Negative.    Musculoskeletal: Positive for arthralgias. Negative for back pain, myalgias and neck pain.   Skin: Negative.    Allergic/Immunologic: Negative.    Neurological: Positive for weakness. Negative for dizziness, syncope, light-headedness, numbness and headaches.   Hematological: Bruises/bleeds easily.   Psychiatric/Behavioral: Positive for sleep disturbance (insomnia). Negative for agitation. The patient is not nervous/anxious.        Objective   Vitals:    03/23/21 1535   BP: 127/80   BP Location: Left arm   Patient Position: Sitting   Pulse: 64   SpO2: 95%   Weight: 84.1 kg (185 lb 6.4 oz)   Height: 180.3 cm (70.98\")      /80 (BP " "Location: Left arm, Patient Position: Sitting)   Pulse 64   Ht 180.3 cm (70.98\")   Wt 84.1 kg (185 lb 6.4 oz)   SpO2 95%   BMI 25.87 kg/m²    Lab Results (most recent)     None        Physical Exam  Vitals and nursing note reviewed.   Constitutional:       General: He is not in acute distress.     Appearance: He is well-developed.   HENT:      Head: Normocephalic and atraumatic.   Eyes:      Conjunctiva/sclera: Conjunctivae normal.      Pupils: Pupils are equal, round, and reactive to light.   Neck:      Vascular: No JVD.      Trachea: No tracheal deviation.   Cardiovascular:      Rate and Rhythm: Normal rate and regular rhythm.      Heart sounds: Normal heart sounds.   Pulmonary:      Effort: Pulmonary effort is normal.      Breath sounds: Normal breath sounds.   Abdominal:      General: Bowel sounds are normal. There is no distension.      Palpations: Abdomen is soft. There is no mass.      Tenderness: There is no abdominal tenderness. There is no guarding or rebound.   Musculoskeletal:         General: No tenderness or deformity. Normal range of motion.      Cervical back: Normal range of motion and neck supple.   Skin:     General: Skin is warm and dry.      Coloration: Skin is not pale.      Findings: No erythema or rash.   Neurological:      Mental Status: He is alert and oriented to person, place, and time.   Psychiatric:         Behavior: Behavior normal.         Thought Content: Thought content normal.         Judgment: Judgment normal.           Procedure     ECG 12 Lead    Date/Time: 3/23/2021 3:38 PM  Performed by: Ethan Garcia PA  Authorized by: Ethan Garcia PA   Comparison: compared with previous ECG from 1/10/2020  Comparison to previous ECG: Sinus rhythm at 57, normal axis, no acute changes noted.                 Assessment/Plan      Diagnosis Plan   1. Paroxysmal atrial fibrillation (CMS/HCC)  ECG 12 Lead   2. Essential hypertension  ECG 12 Lead   3. Palpitations  ECG 12 Lead       1. "  At this time, the patient is doing fairly well from general cardiovascular standpoint.  A. fib is well treated with rate control and anticoagulation therapy.  I would make no adjustments to that regimen at this time.    2.  The patient reports normotensive status for the most part.  He will monitor blood pressures closely at home and call to us for any issues.    3.  For any complications, the patient will call immediately.  Otherwise, we will continue to see him on 6-month intervals.         Mia Posadas  reports that he has quit smoking. His smoking use included cigarettes. He has a 10.00 pack-year smoking history. He has quit using smokeless tobacco.  His smokeless tobacco use included chew..        Patient's Body mass index is 25.87 kg/m². BMI is within normal parameters. No follow-up required..     Advance Care Planning   ACP discussion was held with the patient during this visit. Patient does not have an advance directive, declines further assistance.        Electronically signed by:

## 2021-09-20 ENCOUNTER — OFFICE VISIT (OUTPATIENT)
Dept: CARDIOLOGY | Facility: CLINIC | Age: 76
End: 2021-09-20

## 2021-09-20 VITALS
HEART RATE: 67 BPM | WEIGHT: 184.2 LBS | DIASTOLIC BLOOD PRESSURE: 86 MMHG | OXYGEN SATURATION: 94 % | SYSTOLIC BLOOD PRESSURE: 124 MMHG | HEIGHT: 71 IN | BODY MASS INDEX: 25.79 KG/M2

## 2021-09-20 DIAGNOSIS — I48.0 PAROXYSMAL ATRIAL FIBRILLATION (HCC): Primary | ICD-10-CM

## 2021-09-20 DIAGNOSIS — R06.02 SHORTNESS OF BREATH: ICD-10-CM

## 2021-09-20 DIAGNOSIS — I10 ESSENTIAL HYPERTENSION: ICD-10-CM

## 2021-09-20 PROCEDURE — 99213 OFFICE O/P EST LOW 20 MIN: CPT | Performed by: PHYSICIAN ASSISTANT

## 2021-09-20 NOTE — PATIENT INSTRUCTIONS

## 2021-09-20 NOTE — PROGRESS NOTES
Problem list     Subjective   Mia Posadas is a 75 y.o. male     Chief Complaint   Patient presents with   • Follow-up     6 month    Problem list:  1.  Paroxysmal atrial fibrillation.  1.1.  Managed currently with rate control and anticoagulation therapy  1.2.  Failure previously with flecainide therapy.  2.  Hypertension  3.  Low risk nuclear stress test, 9/2019.  4.  Low normal systolic function, estimated EF at 50%.    HPI  The patient presents into the clinic today for routine evaluation and follow-up. Since last evaluation, the patient is done reasonably well. He still takes rate control and anticoagulation to address A. fib. He reports that he modifies that when needed at home pending clinical course. He modifies beta-blocker when he is hypotensive or bradycardic. He occasionally will take only half of his Eliquis when he notes increasing bleeding or bruising issues otherwise. We encouraged him for compliance at home. He otherwise has no chest pain. He has stable dyspnea. He has no failure or dysrhythmic symptoms. He has no further complaints.    Current Outpatient Medications on File Prior to Visit   Medication Sig Dispense Refill   • acetaminophen (TYLENOL) 500 MG tablet Take 500 mg by mouth Every 6 (Six) Hours As Needed for Mild Pain .     • amitriptyline (ELAVIL) 25 MG tablet Take 25 mg by mouth As Needed.     • apixaban (ELIQUIS) 5 MG tablet tablet Take 5 mg by mouth 2 (Two) Times a Day.     • aspirin 81 MG EC tablet Take 81 mg by mouth Daily.     • clonazePAM (KlonoPIN) 0.5 MG tablet Take 1 mg by mouth every night at bedtime.     • diphenhydrAMINE (BENADRYL) 25 mg capsule Take 25 mg by mouth every night at bedtime.     • lovastatin (MEVACOR) 10 MG tablet Take 20 mg by mouth Daily.     • Melatonin 3 MG capsule Take  by mouth As Needed.     • metoprolol tartrate (LOPRESSOR) 25 MG tablet Take 0.5 tablets by mouth 2 (Two) Times a Day. 90 tablet 3     No current facility-administered medications on file  "prior to visit.       Sotalol    Past Medical History:   Diagnosis Date   • Atrial fibrillation (CMS/HCC)    • Insomnia    • Tinnitus     B ear ringing       Social History     Socioeconomic History   • Marital status:      Spouse name: Not on file   • Number of children: Not on file   • Years of education: Not on file   • Highest education level: Not on file   Tobacco Use   • Smoking status: Former Smoker     Packs/day: 1.00     Years: 10.00     Pack years: 10.00     Types: Cigarettes   • Smokeless tobacco: Former User     Types: Chew   Substance and Sexual Activity   • Alcohol use: No   • Drug use: No   • Sexual activity: Defer       Family History   Problem Relation Age of Onset   • Breast cancer Mother    • Arrhythmia Mother    • Mitral valve prolapse Mother    • No Known Problems Father        Review of Systems   Constitutional: Positive for fatigue. Negative for chills and fever.   HENT: Negative.  Negative for congestion, rhinorrhea and sore throat.    Eyes: Positive for visual disturbance (reading glasses).   Respiratory: Negative.  Negative for chest tightness, shortness of breath and wheezing.    Cardiovascular: Negative.  Negative for chest pain, palpitations and leg swelling.   Gastrointestinal: Negative.    Endocrine: Negative.    Genitourinary: Negative.    Musculoskeletal: Positive for arthralgias and neck pain. Negative for back pain.   Skin: Negative.  Negative for rash and wound.   Allergic/Immunologic: Positive for environmental allergies.   Neurological: Negative.  Negative for dizziness, weakness, numbness and headaches.   Hematological: Bruises/bleeds easily (bruises/ bleeds).   Psychiatric/Behavioral: Positive for sleep disturbance (satying asleep ).       Objective   Vitals:    09/20/21 1310   BP: 124/86   BP Location: Left arm   Patient Position: Sitting   Pulse: 67   SpO2: 94%   Weight: 83.6 kg (184 lb 3.2 oz)   Height: 180.3 cm (70.98\")      /86 (BP Location: Left arm, Patient " "Position: Sitting)   Pulse 67   Ht 180.3 cm (70.98\")   Wt 83.6 kg (184 lb 3.2 oz)   SpO2 94%   BMI 25.71 kg/m²    Lab Results (most recent)     None        Physical Exam  Vitals and nursing note reviewed.   Constitutional:       General: He is not in acute distress.     Appearance: He is well-developed.   HENT:      Head: Normocephalic and atraumatic.   Eyes:      Conjunctiva/sclera: Conjunctivae normal.      Pupils: Pupils are equal, round, and reactive to light.   Neck:      Vascular: No JVD.      Trachea: No tracheal deviation.   Cardiovascular:      Rate and Rhythm: Normal rate and regular rhythm.      Heart sounds: Normal heart sounds.   Pulmonary:      Effort: Pulmonary effort is normal.      Breath sounds: Normal breath sounds.   Abdominal:      General: Bowel sounds are normal. There is no distension.      Palpations: Abdomen is soft. There is no mass.      Tenderness: There is no abdominal tenderness. There is no guarding or rebound.   Musculoskeletal:         General: No tenderness or deformity. Normal range of motion.      Cervical back: Normal range of motion and neck supple.   Skin:     General: Skin is warm and dry.      Coloration: Skin is not pale.      Findings: No erythema or rash.   Neurological:      Mental Status: He is alert and oriented to person, place, and time.   Psychiatric:         Behavior: Behavior normal.         Thought Content: Thought content normal.         Judgment: Judgment normal.           Procedure   Procedures       Assessment/Plan      Diagnosis Plan   1. Paroxysmal atrial fibrillation (CMS/HCC)     2. Essential hypertension     3. Shortness of breath       1. Clinically, the patient is doing well. A. fib appears to be fairly well controlled with low-dose rate control and anticoagulation therapy. I will continue that regimen without change.    2. The patient appears to be mostly normotensive when checked at home. He will monitor blood pressures closely and call to us " for any issues with the same.    3. Previous cardiovascular evaluation has been benign, with the exception of findings positive for atrial fibrillation. I do not feel anything further is indicated at this time as the patient is doing well.    4. Nothing further and we will anticipate seeing the patient on routine 6-month intervals.                Advance Care Planning   ACP discussion was declined by the patient. Patient does not have an advance directive, declines further assistance.        Electronically signed by:

## 2022-03-22 ENCOUNTER — OFFICE VISIT (OUTPATIENT)
Dept: CARDIOLOGY | Facility: CLINIC | Age: 77
End: 2022-03-22

## 2022-03-22 VITALS
WEIGHT: 186 LBS | BODY MASS INDEX: 25.96 KG/M2 | OXYGEN SATURATION: 95 % | DIASTOLIC BLOOD PRESSURE: 75 MMHG | SYSTOLIC BLOOD PRESSURE: 123 MMHG | HEART RATE: 62 BPM

## 2022-03-22 DIAGNOSIS — I48.0 PAROXYSMAL ATRIAL FIBRILLATION: Primary | ICD-10-CM

## 2022-03-22 DIAGNOSIS — R06.02 SHORTNESS OF BREATH: ICD-10-CM

## 2022-03-22 DIAGNOSIS — I10 ESSENTIAL HYPERTENSION: ICD-10-CM

## 2022-03-22 PROCEDURE — 99213 OFFICE O/P EST LOW 20 MIN: CPT | Performed by: PHYSICIAN ASSISTANT

## 2022-03-22 PROCEDURE — 93000 ELECTROCARDIOGRAM COMPLETE: CPT | Performed by: PHYSICIAN ASSISTANT

## 2022-03-22 NOTE — PROGRESS NOTES
Problem list     Subjective   Mia Posadas is a 76 y.o. male     Chief Complaint   Patient presents with   • Follow-up     6 month    • Atrial Fibrillation   Problem list:  1.  Paroxysmal atrial fibrillation.  1.1.  Managed currently with rate control and anticoagulation therapy  1.2.  Failure previously with flecainide therapy.  2.  Hypertension  3.  Low risk nuclear stress test, 9/2019.  4.  Low normal systolic function, estimated EF at 50%.    HPI  The patient presents to the clinic today for routine evaluation and follow-up.  For the most part, the patient has done fairly well since his last evaluation.  History includes A. fib, reasonably well controlled with very low-dose metoprolol.  He still has episodes which by description represent short-lived episodes of A. fib.  These last on an average 3 to 5 minutes and then typically resolve.  He reports mostly tachycardia at that time, heart rates approaching 110-120 beats a minute.  Outside of these episodes, which typically only occur once a month or so, he has done well.  At times he will note bradycardia at night, but nothing to suggest sustained bradycardia dysrhythmic activity.  He denies chest pain.  He has stable dyspnea.  He has no dizziness or syncope.  He denies failure symptoms.  He is tolerating anticoagulation without bleeding complications.  The patient has no further complaints otherwise at this time.    Current Outpatient Medications on File Prior to Visit   Medication Sig Dispense Refill   • acetaminophen (TYLENOL) 500 MG tablet Take 500 mg by mouth Every 6 (Six) Hours As Needed for Mild Pain .     • amitriptyline (ELAVIL) 25 MG tablet Take 25 mg by mouth As Needed.     • apixaban (ELIQUIS) 5 MG tablet tablet Take 5 mg by mouth 2 (Two) Times a Day.     • aspirin 81 MG EC tablet Take 81 mg by mouth Daily.     • clonazePAM (KlonoPIN) 0.5 MG tablet Take 1 mg by mouth every night at bedtime.     • diphenhydrAMINE (BENADRYL) 25 mg capsule Take 25 mg  by mouth every night at bedtime.     • lovastatin (MEVACOR) 10 MG tablet Take 20 mg by mouth Daily.     • Melatonin 3 MG capsule Take  by mouth As Needed.     • metoprolol tartrate (LOPRESSOR) 25 MG tablet Take 0.5 tablets by mouth 2 (Two) Times a Day. 90 tablet 3     No current facility-administered medications on file prior to visit.       Sotalol    Past Medical History:   Diagnosis Date   • Atrial fibrillation (HCC)    • Insomnia    • Tinnitus     B ear ringing       Social History     Socioeconomic History   • Marital status:    Tobacco Use   • Smoking status: Former Smoker     Packs/day: 1.00     Years: 10.00     Pack years: 10.00     Types: Cigarettes   • Smokeless tobacco: Former User     Types: Chew   Substance and Sexual Activity   • Alcohol use: No   • Drug use: No   • Sexual activity: Defer       Family History   Problem Relation Age of Onset   • Breast cancer Mother    • Arrhythmia Mother    • Mitral valve prolapse Mother    • No Known Problems Father        Review of Systems   Constitutional: Positive for chills and fatigue. Negative for fever.   HENT: Negative.  Negative for congestion, rhinorrhea and sore throat.    Eyes: Positive for visual disturbance (reading glasses).   Respiratory: Negative for chest tightness, shortness of breath and wheezing.    Cardiovascular: Positive for palpitations. Negative for chest pain and leg swelling.   Gastrointestinal: Negative.    Endocrine: Positive for cold intolerance.   Genitourinary: Negative.    Musculoskeletal: Positive for arthralgias. Negative for back pain and neck pain.   Skin: Negative.  Negative for rash and wound.   Allergic/Immunologic: Positive for environmental allergies.   Neurological: Negative.  Negative for dizziness, weakness, numbness and headaches.   Hematological: Bruises/bleeds easily (bruises).   Psychiatric/Behavioral: Negative.  Negative for sleep disturbance.       Objective   Vitals:    03/22/22 0940   BP: 123/75   BP  Location: Left arm   Patient Position: Sitting   Pulse: 62   SpO2: 95%   Weight: 84.4 kg (186 lb)      /75 (BP Location: Left arm, Patient Position: Sitting)   Pulse 62   Wt 84.4 kg (186 lb)   SpO2 95%   BMI 25.96 kg/m²    Lab Results (most recent)     None        Physical Exam  Vitals and nursing note reviewed.   Constitutional:       General: He is not in acute distress.     Appearance: He is well-developed.   HENT:      Head: Normocephalic and atraumatic.   Eyes:      Conjunctiva/sclera: Conjunctivae normal.      Pupils: Pupils are equal, round, and reactive to light.   Neck:      Vascular: No JVD.      Trachea: No tracheal deviation.   Cardiovascular:      Rate and Rhythm: Normal rate and regular rhythm.      Heart sounds: Murmur heard.    Systolic (Upper LSB.) murmur is present with a grade of 1/6.  Pulmonary:      Effort: Pulmonary effort is normal.      Breath sounds: Normal breath sounds.   Abdominal:      General: Bowel sounds are normal. There is no distension.      Palpations: Abdomen is soft. There is no mass.      Tenderness: There is no abdominal tenderness. There is no guarding or rebound.   Musculoskeletal:         General: No tenderness or deformity. Normal range of motion.      Cervical back: Normal range of motion and neck supple.   Skin:     General: Skin is warm and dry.      Coloration: Skin is not pale.      Findings: No erythema or rash.   Neurological:      Mental Status: He is alert and oriented to person, place, and time.   Psychiatric:         Behavior: Behavior normal.         Thought Content: Thought content normal.         Judgment: Judgment normal.           Procedure     ECG 12 Lead    Date/Time: 3/22/2022 9:43 AM  Performed by: Ethan Garcia PA  Authorized by: Ethan Garcia PA   Comparison: compared with previous ECG from 3/23/2021  Comparison to previous ECG: Sinus rhythm, rate 59, normal axis, minor nonspecific ST-T wave changes, no acute changes  noted.                 Assessment/Plan      Diagnosis Plan   1. Paroxysmal atrial fibrillation (HCC)     2. Shortness of breath     3. Essential hypertension       1.  At this time, the patient appears to be doing reasonably well from general cardiovascular standpoint.  A. fib is well treated with low-dose rate control and anticoagulation therapy.  He is minimally symptomatic with the same.  I will continue that regimen without change.    2.  Clinically, his dyspnea is at baseline.  He has really no further issues outside of intermittent episodes of A. fib which are very short-lived.  Previa stress and echo studies have been benign.  I do not feel further work-up is warranted.    3.  Blood pressures continue to be very well controlled.  He will monitor blood pressures at home and call to us for any issues with the same.    4.  We will continue to see this gentleman on 6-month intervals, sooner for complications.  I feel that he is very much stable at this time.                  Advance Care Planning   ACP discussion was declined by the patient. Patient does not have an advance directive, declines further assistance.         Electronically signed by:

## 2022-08-31 ENCOUNTER — TELEPHONE (OUTPATIENT)
Dept: CARDIOLOGY | Facility: CLINIC | Age: 77
End: 2022-08-31

## 2022-08-31 NOTE — TELEPHONE ENCOUNTER
----- Message from Mia Posadas sent at 8/30/2022  8:53 AM EDT -----  Regarding: Effects of natural herbs and spices  Will st.johns wort 250mg. And ginkgo 50mg daily effect negatively with my meds. Of eliquis & metoprol for A-fib & hr (which is under control) these ingredients I am asking about are in Auritine an all natural supplement that could help help my severe tinnutis if used. I HAVE had for yrs., but increasingly gets worse yr. after yr.      L

## 2022-08-31 NOTE — TELEPHONE ENCOUNTER
Per Ethan Garcia,PAC verbal  Okay to take w/o any significant interactions. Patient verbally understands.Will call back with anymore concerns.    SAKINA HOWE MA

## 2022-10-06 ENCOUNTER — OFFICE VISIT (OUTPATIENT)
Dept: CARDIOLOGY | Facility: CLINIC | Age: 77
End: 2022-10-06

## 2022-10-06 VITALS
WEIGHT: 185 LBS | HEART RATE: 64 BPM | HEIGHT: 71 IN | BODY MASS INDEX: 25.9 KG/M2 | SYSTOLIC BLOOD PRESSURE: 130 MMHG | DIASTOLIC BLOOD PRESSURE: 78 MMHG | OXYGEN SATURATION: 95 %

## 2022-10-06 DIAGNOSIS — I48.0 PAROXYSMAL ATRIAL FIBRILLATION: ICD-10-CM

## 2022-10-06 DIAGNOSIS — R06.02 SHORTNESS OF BREATH: ICD-10-CM

## 2022-10-06 DIAGNOSIS — R53.83 OTHER FATIGUE: ICD-10-CM

## 2022-10-06 DIAGNOSIS — I35.1 AORTIC VALVE INSUFFICIENCY, ETIOLOGY OF CARDIAC VALVE DISEASE UNSPECIFIED: Primary | ICD-10-CM

## 2022-10-06 DIAGNOSIS — R00.2 PALPITATIONS: ICD-10-CM

## 2022-10-06 DIAGNOSIS — I10 ESSENTIAL HYPERTENSION: ICD-10-CM

## 2022-10-06 PROCEDURE — 99214 OFFICE O/P EST MOD 30 MIN: CPT | Performed by: INTERNAL MEDICINE

## 2022-10-06 NOTE — PROGRESS NOTES
Subjective   Mia Posadas is a 76 y.o. male     Chief Complaint   Patient presents with   • Follow-up     Here for 6 mo. F/u   • Atrial Fibrillation   • Shortness of Breath       PROBLEM LIST:     1.  Paroxysmal atrial fibrillation, on Eliquis  1.1 Failure previously with flecainide therapy.  2.  Hypertension  3.  Low risk nuclear stress test, 9/2019.  4.  Low normal systolic function, estimated EF at 50%.    Specialty Problems        Cardiology Problems    Aortic valve regurgitation        Paroxysmal atrial fibrillation (HCC)                HPI:  Mr. Posadas returns for follow-up on the above.    He continues to have short-lived episodes of palpitations which last only minutes in which or not associated with lightheadedness or dizziness, chest pain, or dyspnea.  He describes no chest discomfort.  He has no orthopnea, PND, lower extremity edema.  His major complaint today is of easy fatigability, decreased muscle strength, and decreased functional capacity.  He describes that he can work for several hours if he is in a cool environment but he does not any heavy activity and that he he is extremely weak and fatigued after 1 hour.  There is no clear-cut association between palpitations and weakness and fatigue.    The patient describes no claudication or other symptoms of peripheral arterial disease.  He has had no symptoms of arterial embolic events to include no symptoms of TIA or stroke.  Mr. Posadas describes tinnitus which has been chronic for years.  He notices that when palpitates he can hear his heart rhythm in his ears.  The patient had moderate aortic insufficiency by prior echo with a left ventricular end-systolic dimension of 4.0.  He has no symptoms of arterial embolic events, and no symptoms of endocarditis.  As noted above he has no orthopnea or PND to suggest decompensation.  He denies hemoptysis, hematemesis, melena, hematochezia or hematuria on Eliquis.                    PRIOR  MEDICATIONS    Current Outpatient Medications on File Prior to Visit   Medication Sig Dispense Refill   • acetaminophen (TYLENOL) 500 MG tablet Take 500 mg by mouth Every 6 (Six) Hours As Needed for Mild Pain .     • amitriptyline (ELAVIL) 25 MG tablet Take 25 mg by mouth As Needed.     • apixaban (ELIQUIS) 5 MG tablet tablet Take 5 mg by mouth 2 (Two) Times a Day.     • aspirin 81 MG EC tablet Take 81 mg by mouth Daily.     • clonazePAM (KlonoPIN) 0.5 MG tablet Take 1 mg by mouth every night at bedtime.     • diphenhydrAMINE (BENADRYL) 25 mg capsule Take 25 mg by mouth every night at bedtime.     • lovastatin (MEVACOR) 10 MG tablet Take 20 mg by mouth Daily.     • Melatonin 3 MG capsule Take  by mouth As Needed.     • metoprolol tartrate (LOPRESSOR) 25 MG tablet Take one half tablet by mouth twice daily (Patient taking differently: Depends on activity, sometimes just takes 12.5 mg daily) 60 tablet 4     No current facility-administered medications on file prior to visit.       ALLERGIES:    Sotalol    PAST MEDICAL HISTORY:    Past Medical History:   Diagnosis Date   • Atrial fibrillation (HCC)    • Insomnia    • Tinnitus     B ear ringing       SURGICAL HISTORY:    Past Surgical History:   Procedure Laterality Date   • KIDNEY STONE SURGERY         SOCIAL HISTORY:    Social History     Socioeconomic History   • Marital status:    Tobacco Use   • Smoking status: Former Smoker     Packs/day: 1.00     Years: 10.00     Pack years: 10.00     Types: Cigarettes   • Smokeless tobacco: Former User     Types: Chew   Substance and Sexual Activity   • Alcohol use: No   • Drug use: No   • Sexual activity: Defer       FAMILY HISTORY:    Family History   Problem Relation Age of Onset   • Breast cancer Mother    • Arrhythmia Mother    • Mitral valve prolapse Mother    • No Known Problems Father        Review of Systems   Constitutional: Positive for fatigue (r/t not sleeping well and aging).   HENT: Negative.    Eyes:  "Positive for visual disturbance (glasses prn).   Respiratory: Negative.    Cardiovascular: Positive for palpitations (HX PAF). Negative for chest pain and leg swelling.   Gastrointestinal: Negative.  Negative for blood in stool.   Endocrine: Negative.    Genitourinary: Negative.  Negative for hematuria.   Musculoskeletal: Positive for arthralgias and myalgias.   Skin: Negative.    Allergic/Immunologic: Positive for environmental allergies.   Neurological: Negative.    Hematological: Bruises/bleeds easily (on eliquis).   Psychiatric/Behavioral: Positive for sleep disturbance.       VISIT VITALS:  Vitals:    10/06/22 0926   BP: 130/78   BP Location: Left arm   Patient Position: Sitting   Pulse: 64   SpO2: 95%   Weight: 83.9 kg (185 lb)   Height: 180.3 cm (71\")      /78 (BP Location: Left arm, Patient Position: Sitting)   Pulse 64   Ht 180.3 cm (71\")   Wt 83.9 kg (185 lb)   SpO2 95%   BMI 25.80 kg/m²     RECENT LABS:    Objective       Physical Exam  Vitals and nursing note reviewed.   Constitutional:       General: He is not in acute distress.     Appearance: He is well-developed.   HENT:      Head: Normocephalic and atraumatic.   Eyes:      Conjunctiva/sclera: Conjunctivae normal.      Pupils: Pupils are equal, round, and reactive to light.   Neck:      Vascular: No carotid bruit, hepatojugular reflux or JVD.      Trachea: No tracheal deviation.      Comments: Nl. Carotid upstrokes  Cardiovascular:      Rate and Rhythm: Normal rate and regular rhythm.      Pulses:           Radial pulses are 2+ on the right side and 2+ on the left side.      Heart sounds: S1 normal and S2 normal. Murmur heard.    Systolic murmur is present.    No friction rub. Gallop present. S4 sounds present. No S3 sounds.      Comments: 1/6 systolic ejection murmur RUSB  1-2/6 AI  1/6 TR  No MR  Pulmonary:      Effort: Pulmonary effort is normal.      Breath sounds: Normal breath sounds. No wheezing, rhonchi or rales.      Comments: " Crackles rt. base  Abdominal:      General: Bowel sounds are normal. There is no distension or abdominal bruit.      Palpations: Abdomen is soft. There is no mass.      Tenderness: There is no abdominal tenderness. There is no guarding or rebound.      Comments: No organomegaly   Musculoskeletal:         General: No tenderness or deformity. Normal range of motion.      Cervical back: Normal range of motion and neck supple.      Right lower leg: No edema.      Left lower leg: No edema.      Comments: BLE, no edema, palpable PT pedal pulse     Skin:     General: Skin is warm and dry.      Coloration: Skin is not pale.      Findings: No erythema or rash.   Neurological:      Mental Status: He is alert and oriented to person, place, and time.   Psychiatric:         Behavior: Behavior normal.         Thought Content: Thought content normal.         Judgment: Judgment normal.         Procedures      Assessment & Plan   #1.  Paroxysmal atrial fibrillation.  The patient is minimally symptomatic and is tolerating Eliquis without bleeding.  We will continue current medications.  I do not believe that episodic atrial fib nor chronotropic incompetence is contributing to any significant degree and the patient is increased exertional dyspnea and decreased functional capacity.  However, the patient describes that he sometimes has to reduce his metoprolol dosing because of baseline bradycardia.  We may need to perform treadmill stress testing to assess chronotropic competence if symptoms worsen.    2.  Valvular heart disease with moderate aortic insufficiency.  I am concerned about the patient's physical exam today with rales in the right base as well as prior echo demonstrating borderline increased end-systolic dimensions.  We will repeat echo to reassess LV systolic and diastolic performance, LV dimensions, and pulmonary pressures.    3.  Dyslipidemia.  The patient has persistently low HDL and LDL cholesterol was 122.  We will  defer to Jacqueline Rader in that regard.    4.  Low normal LV systolic function by prior evaluation.  Echo will help reassess LV systolic performance.  If Is not normal we we will pursue pharmacotherapy directed at improving LV systolic function.    5.  Mr. Posadas will follow with Jacqueline Rader as instructed we will plan on seeing him in follow-up after testing or on appearing basis as above.   Diagnosis Plan   1. Aortic valve insufficiency, etiology of cardiac valve disease unspecified     2. Paroxysmal atrial fibrillation (HCC)     3. Shortness of breath     4. Other fatigue         No follow-ups on file.         Mia Posadas  reports that he has quit smoking. His smoking use included cigarettes. He has a 10.00 pack-year smoking history. He has quit using smokeless tobacco.  His smokeless tobacco use included chew.. I have educated him on the risk of diseases from using tobacco products such as cancer, COPD and heart disease.               BMI is >= 25 and <30. (Overweight) The following options were offered after discussion;: pcp addressing       Advance Care Planning   ACP discussion was held with the patient during this visit. Patient does not have an advance directive, declines further assistance.         Electronically signed by:    Scribed for Dwain Kennedy MD by Iza Arguelles LPN on October 6, 2022  at 09:31 EDT    I, Dwain Kennedy MD personally performed the services described in this documentation as scribed by the above named individual in my presence, and it is both accurate and complete. October 6, 2022 09:31 EDT      Dictated Utilizing Dragon Dictation: Part of this note may be an electronic transcription/translation of spoken language to printed text using the Dragon Dictation System.

## 2022-11-18 ENCOUNTER — HOSPITAL ENCOUNTER (OUTPATIENT)
Dept: CARDIOLOGY | Facility: HOSPITAL | Age: 77
Discharge: HOME | End: 2022-11-18
Admitting: INTERNAL MEDICINE

## 2022-11-18 DIAGNOSIS — R53.83 OTHER FATIGUE: ICD-10-CM

## 2022-11-18 DIAGNOSIS — I35.1 AORTIC VALVE INSUFFICIENCY, ETIOLOGY OF CARDIAC VALVE DISEASE UNSPECIFIED: ICD-10-CM

## 2022-11-18 DIAGNOSIS — I10 ESSENTIAL HYPERTENSION: ICD-10-CM

## 2022-11-18 DIAGNOSIS — I48.0 PAROXYSMAL ATRIAL FIBRILLATION: ICD-10-CM

## 2022-11-18 DIAGNOSIS — R00.2 PALPITATIONS: ICD-10-CM

## 2022-11-18 PROCEDURE — 93306 TTE W/DOPPLER COMPLETE: CPT

## 2022-11-18 PROCEDURE — 93306 TTE W/DOPPLER COMPLETE: CPT | Performed by: INTERNAL MEDICINE

## 2022-12-07 ENCOUNTER — TELEPHONE (OUTPATIENT)
Dept: CARDIOLOGY | Facility: CLINIC | Age: 77
End: 2022-12-07

## 2022-12-07 NOTE — TELEPHONE ENCOUNTER
ECHO RESULTS BRIEFLY DISCUSSED AND AWARE TO KEEP F/U APPT. IN JAN. SHAVON        ----- Message from Dwain Kennedy MD sent at 12/7/2022  4:54 PM EST -----  Keep f/u appt. In Jan.   ----- Message -----  From: Dwain Kennedy MD  Sent: 12/4/2022  12:27 PM EST  To: Dwain Kennedy MD

## 2023-01-12 ENCOUNTER — TELEPHONE (OUTPATIENT)
Dept: CARDIOLOGY | Facility: CLINIC | Age: 78
End: 2023-01-12
Payer: MEDICARE

## 2023-01-12 ENCOUNTER — OFFICE VISIT (OUTPATIENT)
Dept: CARDIOLOGY | Facility: CLINIC | Age: 78
End: 2023-01-12
Payer: MEDICARE

## 2023-01-12 VITALS
HEIGHT: 71 IN | BODY MASS INDEX: 26.12 KG/M2 | DIASTOLIC BLOOD PRESSURE: 76 MMHG | WEIGHT: 186.6 LBS | SYSTOLIC BLOOD PRESSURE: 125 MMHG | OXYGEN SATURATION: 96 % | HEART RATE: 63 BPM

## 2023-01-12 DIAGNOSIS — R06.02 SHORTNESS OF BREATH: ICD-10-CM

## 2023-01-12 DIAGNOSIS — I48.0 PAROXYSMAL ATRIAL FIBRILLATION: ICD-10-CM

## 2023-01-12 DIAGNOSIS — I51.9 SYSTOLIC DYSFUNCTION: ICD-10-CM

## 2023-01-12 DIAGNOSIS — R00.1 BRADYCARDIA: ICD-10-CM

## 2023-01-12 DIAGNOSIS — R53.83 OTHER FATIGUE: ICD-10-CM

## 2023-01-12 DIAGNOSIS — I35.1 AORTIC VALVE INSUFFICIENCY, ETIOLOGY OF CARDIAC VALVE DISEASE UNSPECIFIED: Primary | ICD-10-CM

## 2023-01-12 LAB
BH CV ECHO MEAS - ACS: 1.91 CM
BH CV ECHO MEAS - AI P1/2T: 836.1 MSEC
BH CV ECHO MEAS - AO MAX PG: 8.8 MMHG
BH CV ECHO MEAS - AO MEAN PG: 5 MMHG
BH CV ECHO MEAS - AO ROOT DIAM: 3.8 CM
BH CV ECHO MEAS - AO V2 MAX: 148 CM/SEC
BH CV ECHO MEAS - AO V2 VTI: 31.8 CM
BH CV ECHO MEAS - AVA(I,D): 2.6 CM2
BH CV ECHO MEAS - EDV(CUBED): 161 ML
BH CV ECHO MEAS - EDV(MOD-SP4): 124 ML
BH CV ECHO MEAS - EF(MOD-SP4): 49.3 %
BH CV ECHO MEAS - EF_3D-VOL: 53 %
BH CV ECHO MEAS - ESV(CUBED): 54.9 ML
BH CV ECHO MEAS - ESV(MOD-SP4): 62.9 ML
BH CV ECHO MEAS - FS: 30.1 %
BH CV ECHO MEAS - IVS/LVPW: 1.12 CM
BH CV ECHO MEAS - IVSD: 1.15 CM
BH CV ECHO MEAS - LA DIMENSION: 3.4 CM
BH CV ECHO MEAS - LAT PEAK E' VEL: 6.7 CM/SEC
BH CV ECHO MEAS - LV DIASTOLIC VOL/BSA (35-75): 60.8 CM2
BH CV ECHO MEAS - LV MASS(C)D: 234.8 GRAMS
BH CV ECHO MEAS - LV MAX PG: 3.6 MMHG
BH CV ECHO MEAS - LV MEAN PG: 1.82 MMHG
BH CV ECHO MEAS - LV SYSTOLIC VOL/BSA (12-30): 30.8 CM2
BH CV ECHO MEAS - LV V1 MAX: 95.1 CM/SEC
BH CV ECHO MEAS - LV V1 VTI: 19.5 CM
BH CV ECHO MEAS - LVIDD: 5.4 CM
BH CV ECHO MEAS - LVIDS: 3.8 CM
BH CV ECHO MEAS - LVOT AREA: 4.3 CM2
BH CV ECHO MEAS - LVOT DIAM: 2.34 CM
BH CV ECHO MEAS - LVPWD: 1.03 CM
BH CV ECHO MEAS - MED PEAK E' VEL: 5.5 CM/SEC
BH CV ECHO MEAS - MV A MAX VEL: 65.1 CM/SEC
BH CV ECHO MEAS - MV DEC SLOPE: 284.5 CM/SEC2
BH CV ECHO MEAS - MV E MAX VEL: 52.7 CM/SEC
BH CV ECHO MEAS - MV E/A: 0.81
BH CV ECHO MEAS - RAP SYSTOLE: 10 MMHG
BH CV ECHO MEAS - RVDD: 3.4 CM
BH CV ECHO MEAS - RVSP: 27.5 MMHG
BH CV ECHO MEAS - SI(MOD-SP4): 29.9 ML/M2
BH CV ECHO MEAS - SV(LVOT): 84.1 ML
BH CV ECHO MEAS - SV(MOD-SP4): 61.1 ML
BH CV ECHO MEAS - TR MAX PG: 17.5 MMHG
BH CV ECHO MEAS - TR MAX VEL: 209 CM/SEC
BH CV ECHO MEASUREMENTS AVERAGE E/E' RATIO: 8.64
LEFT ATRIUM VOLUME INDEX: 20.9 ML/M2
MAXIMAL PREDICTED HEART RATE: 145 BPM
STRESS TARGET HR: 123 BPM

## 2023-01-12 PROCEDURE — 99213 OFFICE O/P EST LOW 20 MIN: CPT | Performed by: INTERNAL MEDICINE

## 2023-01-12 NOTE — PROGRESS NOTES
Subjective   Mia Posadas is a 77 y.o. male     Chief Complaint   Patient presents with   • Follow-up     Here for testing f/u   • Atrial Fibrillation       PROBLEM LIST:     1.  Paroxysmal atrial fibrillation, on Eliquis  1.1 Failure previously with flecainide therapy.  2.  Hypertension  3.  Low risk nuclear stress test, 9/2019.  4.  Echo, 11-, EF 40-45%,  inferior, inferolateral, and basilar lateral hypokinesis to akinesis. Grade 1 DD, trivial-mild MR, mild AI, trivial-mild TR, prox. Aortic root 3.8 cm, pulm. Pressures high 20's      Specialty Problems        Cardiology Problems    Aortic valve regurgitation        Paroxysmal atrial fibrillation (HCC)             HPI:  Mr. Posadas returns for follow-up on the above.    Echo was performed to assess for worsening aortic insufficiency, LV systolic or diastolic dysfunction, or constrictive or restrictive physiology as contributing factors to his progressive decline in functional capacity.  Echo demonstrated only mild AI, mildly depressed global LV systolic function (EF 45%) with grade 1 diastolic dysfunction.  A new finding, however, was inferior and posterior lateral wall hypokinesis.  EF was marginally decreased from prior.  The aortic root was borderline dilated at 3.8 cm, pulmonary artery systolic pressures were estimated in the high 20s, and there was no evidence of constrictive or restrictive physiology.                      PRIOR MEDICATIONS    Current Outpatient Medications on File Prior to Visit   Medication Sig Dispense Refill   • amitriptyline (ELAVIL) 25 MG tablet Take 25 mg by mouth As Needed.     • apixaban (ELIQUIS) 5 MG tablet tablet Take 5 mg by mouth 2 (Two) Times a Day.     • clonazePAM (KlonoPIN) 0.5 MG tablet Take 1 mg by mouth every night at bedtime.     • diphenhydrAMINE (BENADRYL) 25 mg capsule Take 25 mg by mouth every night at bedtime.     • lovastatin (MEVACOR) 10 MG tablet Take 20 mg by mouth Daily.     • Melatonin 3 MG capsule  Take  by mouth As Needed.     • metoprolol tartrate (LOPRESSOR) 25 MG tablet Take one half tablet by mouth twice daily (Patient taking differently: Depends on activity, sometimes just takes 12.5 mg daily) 60 tablet 4   • acetaminophen (TYLENOL) 500 MG tablet Take 500 mg by mouth Every 6 (Six) Hours As Needed for Mild Pain .     • [DISCONTINUED] aspirin 81 MG EC tablet Take 81 mg by mouth Daily.       No current facility-administered medications on file prior to visit.       ALLERGIES:    Sotalol    PAST MEDICAL HISTORY:    Past Medical History:   Diagnosis Date   • Atrial fibrillation (HCC)    • Insomnia    • Tinnitus     B ear ringing       SURGICAL HISTORY:    Past Surgical History:   Procedure Laterality Date   • KIDNEY STONE SURGERY         SOCIAL HISTORY:    Social History     Socioeconomic History   • Marital status:    Tobacco Use   • Smoking status: Former     Packs/day: 1.00     Years: 10.00     Pack years: 10.00     Types: Cigarettes   • Smokeless tobacco: Former     Types: Chew   Substance and Sexual Activity   • Alcohol use: No   • Drug use: No   • Sexual activity: Defer       FAMILY HISTORY:    Family History   Problem Relation Age of Onset   • Breast cancer Mother    • Arrhythmia Mother    • Mitral valve prolapse Mother    • No Known Problems Father        Review of Systems   Constitutional: Positive for fatigue.   HENT: Negative.    Eyes: Positive for visual disturbance (glasses).   Respiratory: Negative.    Cardiovascular: Positive for palpitations (occas. ). Negative for chest pain and leg swelling.   Gastrointestinal: Negative.    Endocrine: Negative.    Genitourinary: Negative.    Musculoskeletal: Positive for arthralgias and myalgias.   Skin: Negative.    Allergic/Immunologic: Negative.    Neurological: Negative.    Hematological: Bruises/bleeds easily.   Psychiatric/Behavioral: Negative.        VISIT VITALS:  Vitals:    01/12/23 1054   BP: 125/76   BP Location: Left arm   Patient  "Position: Sitting   Pulse: 63   SpO2: 96%   Weight: 84.6 kg (186 lb 9.6 oz)   Height: 180.3 cm (70.98\")      /76 (BP Location: Left arm, Patient Position: Sitting)   Pulse 63   Ht 180.3 cm (70.98\")   Wt 84.6 kg (186 lb 9.6 oz)   SpO2 96%   BMI 26.04 kg/m²     RECENT LABS:    Objective       Physical Exam    Procedures      Assessment & Plan   #1.  Progressive exertional dyspnea.  This does not appear to be related to worsening aortic insufficiency, pulmonary hypertension, or constrictive or restrictive physiology.  As Mr. Posadas has a new focal wall motion abnormality, is at moderate risk for coronary artery disease, I feel that ischemia needs to be excluded as a cause.  Additionally, we cannot exclude chronotropic incompetence at this point.  Therefore, we will perform treadmill Cardiolite stress testing to evaluate both as potential contributing factors.    2.  Other problems as described above are stable.    3.  Mr. Ang will follow with Jacqueline Rader as instructed and we will arrange follow-up based on findings of stress testing and clinical course.   Diagnosis Plan   1. Aortic valve insufficiency, etiology of cardiac valve disease unspecified        2. Paroxysmal atrial fibrillation (HCC)        3. Shortness of breath        4. Other fatigue            No follow-ups on file.         Mia Posadas  reports that he has quit smoking. His smoking use included cigarettes. He has a 10.00 pack-year smoking history. He has quit using smokeless tobacco.  His smokeless tobacco use included chew.. I have educated him on the risk of diseases from using tobacco products such as cancer, COPD and heart disease.     Advance Care Planning   ACP discussion was held with the patient during this visit. Patient does not have an advance directive, declines further assistance.             BMI is >= 25 and <30. (Overweight) The following options were offered after discussion;: pcp addressing             Electronically " signed by:    Scribed for Dwain Kennedy MD by Iza Arguelles LPN on January 12, 2023  at 10:58 EST    I, Dwain Kennedy MD personally performed the services described in this documentation as scribed by the above named individual in my presence, and it is both accurate and complete. January 12, 2023 10:58 EST      Dictated Utilizing Dragon Dictation: Part of this note may be an electronic transcription/translation of spoken language to printed text using the Dragon Dictation System.

## 2023-01-12 NOTE — TELEPHONE ENCOUNTER
HUB transferred call that patient was on the backline upset that the wrong test was ordered.   Patient seen by Dr Kennedy today with nuclear stress test ordered. Patient states he told Dr Kennedy he would do a treadmill stress test.     I explained to patient the order states exercise with possible pharmacologic as well as note/comments by Dr Kennedy. He was upset with amount of time scheduled for nuclear test as shown on his finstructions. Explained time is allotted per recommendations when nuclear medication may be used. Testing does not always require 4 hours as instructions note.     Note by Dr Kennedy...We cannot exclude chronotropic incompetence at this point.  Therefore, we will perform treadmill Cardiolite stress testing to evaluate both as potential contributing factors.    Patient was not happy with my explanation and does not feel Dr Kennedy ordered the right test. Advised I would have my  call patient.

## 2023-01-12 NOTE — TELEPHONE ENCOUNTER
"Progress Notes by Michela Cardenas MD at 9/4/2020 12:10 PM     Author: Michela Cardenas MD Service: Nephrology Author Type: Physician    Filed: 9/4/2020 12:16 PM Date of Service: 9/4/2020 12:10 PM Status: Signed    : Michela Cardenas MD (Physician)              [facility] ([facility]) progress note  CC: F/U MAY  S: Patient remains in ICU, she is a still intubated, ventilated, on sedation at the time of visit, she is unable to participate in a review of systems.     Opens eyes briefly to voice.    A/P:       1. MAY: resolved. Creatinine baseline of 0.8-1 with last normal on 8/24 with trend up to 2.88 on 8/27. CT with contrast on 8/20. Patients UA on 8/26 with hyaline cats, squam epi, trace LE, 50 protein, trace ketones.had hypotension, dig toxicity, elevated vancomycin level up to 43.1, all which contributed to MAY.      MAY resolved.    Cont diuresis to improve hypervolemia     2. Digoxin toxicity: 6.9 on 8/26. transferred to ICU. MAY followed by toxicity. Hyperkalemia(now improved). Digifab per ICU, improved.     3. Acute respiratory failure: ongoing opacites - HCAP. Continue management as per ICU team.      CXR shows persistent pulmonary infiltrates, expect HCAP and pulm edema     As per intensivist service.    Cont lasix 40 mg iv bid       4: Acute diverticulitis s/p perforation and abscess s/p drain: per surgery(no surgery planned currently)  -cont micafungin, meropenem    5. Hypomag - due to diuresis  -replaced   -cont to monitor daily      Not much to add, will sign off, call with questions    Michela Cardenas MD  [facility]  [phone] (off)        /48   Pulse 71   Temp 98.3  F (36.8  C) (Oral)   Resp 17   Ht 5' 2\" (1.575 m)   Wt 185 lb 14.4 oz (84.3 kg)   LMP 01/25/1999   SpO2 98%   BMI 34.00 kg/m      I/O last 3 completed shifts:  In: 1722.4 [I.V.:805.4; NG/GT:657; IV Piggyback:260]  Out: 3800 [Urine:3800]    Vent Mode: VCV  FiO2 (%):  [30 %-100 %] 30 %  S " I had extensive conversation with  regarding why a treadmill nuclear stress test was ordered and what Dr. Kennedy id ruling out. He is not happy about the nuclear aspect of it. I told him to address with the stress lab on day of test to see if amount of nuclear for a treadmill nuclear could be less since walking treadmill even though I was told same amount used whether roxnana or treadmill nuclear. I told him after discussion with them he refused the nuclear aspect for them to let us know and order could be changed to a reg. Stress test possibly. He was fine with this, but still was not very agreeable to the nuclear aspect. PH,LPN   RR:  [16] 16  S VT:  [450 mL] 450 mL  PEEP/CPAP (cm H2O):  [5 cm H2O] 5 cm H2O  Minute Ventilation (L/min):  [6.9 L/min-9.1 L/min] 7.4 L/min  PIP:  [6 cm H2O-36 cm H2O] 33 cm H2O  MT SUP:  [5 cm H20-12 cm H20] 12 cm H20  MAP (cm H2O):  [5-9] 9    Physical Exam:   GENERAL: nad,  chronically ill appearing   EYES: No eyelid edema  ENT: ET tube in place with vent support.   RESP:  Patient intubated, on ventilatory support, adequate oxygenation, no respiratory distress..  CV: rr, trace of peripheral edema.   GI: Nt/nd    Musculoskeletal: Diminished muscle bulk/ tone; No gross joint abnormalities  SKIN: No rash, warm/ dry  PSYCH:  Unable due to sedation      Results from last 7 days   Lab Units 09/04/20  0532 09/03/20  2202 09/03/20  0439 09/02/20  0618  08/30/20  0517   LN-SODIUM mmol/L 144  --  142 139   < > 138   LN-POTASSIUM mmol/L 3.8 3.6 3.9 4.3   < > 3.6  3.6   LN-CHLORIDE mmol/L 107  --  107 103   < > 105   LN-CO2 mmol/L 30  --  27 25   < > 22   LN-BLOOD UREA NITROGEN mg/dL 17  --  26 33*   < > 53*   LN-CREATININE mg/dL 0.52*  --  0.53* 0.60   < > 2.48*   LN-CALCIUM mg/dL 9.9  --  9.3 9.1   < > 8.9   LN-ALBUMIN g/dL 1.3*  --  1.4*  --   --  1.4*   LN-PROTEIN TOTAL g/dL  --   --   --   --   --  6.0   LN-BILIRUBIN TOTAL mg/dL  --   --   --   --   --  2.0*   LN-ALKALINE PHOSPHATASE U/L  --   --   --   --   --  152*   LN-ALT (SGPT) U/L  --   --   --   --   --  27   LN-AST (SGOT) U/L  --   --   --   --   --  247*    < > = values in this interval not displayed.     Results from last 7 days   Lab Units 09/04/20  0532 09/03/20  0439 09/02/20  0618   LN-WHITE BLOOD CELL COUNT thou/uL 11.4* 13.3* 13.1*   LN-HEMOGLOBIN g/dL 7.7* 8.1* 9.3*   LN-HEMATOCRIT % 24.8* 25.4* 29.7*   LN-PLATELET COUNT thou/uL 116* 103* 81*         Scheduled Meds:  ? aspirin  81 mg Enteral Tube DAILY   ? chlorhexidine  15 mL Topical Q12H   ? insulin aspart (NovoLOG) injection   Subcutaneous Q4H FIXED TIMES   ? ipratropium-albuteroL  3 mL  Nebulization Q6H - RT   ? meropenem  1 g Intravenous Q8H   ? micafungin (MYCAMINE) IV  100 mg Intravenous Q24H   ? nystatin  5 mL Swish & Swallow QID   ? pantoprazole  40 mg Intravenous BID    Or   ? omeprazole  20 mg Oral BID    Or   ? omeprazole  20 mg Enteral Tube BID   ? [Held by provider] sertraline  100 mg Oral DAILY   ? sodium chloride bacteriostatic  1-5 mL Intradermal Once   ? sodium chloride  10 mL Intravenous Q8H FIXED TIMES   ? sodium chloride  10-30 mL Intravenous Q8H FIXED TIMES   ? ticagrelor  90 mg Enteral Tube BID   ? warfarin - daily dose required   Other Med Consult or Protocol     Continuous Infusions:  ? dexmedetomidine infusion orderable (PRECEDEX) 0.3 mcg/kg/hr (09/04/20 1149)   ? fentaNYL citrate (PF) Stopped (09/04/20 1151)   ? norepinephrine Stopped (09/04/20 0639)   ? propofol Stopped (09/02/20 1030)   ? sodium chloride 0.9% Stopped (09/03/20 1320)   ? vasopressin Stopped (09/02/20 1510)     PRN Meds:.alteplase, atropine, bacitracin, bisacodyL, carboxymethylcellulose, codeine-guaiFENesin, dextrose 50 % (D50W), glucagon (human recombinant), hydrOXYzine pamoate, lidocaine (PF), lipase-protease-amylase **AND** sodium bicarbonate, metoclopramide, [Held by provider] metoprolol tartrate, naloxone **OR** naloxone, oxyCODONE, polyethylene glycol, sodium chloride 0.9%, sodium chloride bacteriostatic, sodium chloride, sodium chloride, sodium chloride, traZODone

## 2023-08-24 ENCOUNTER — OFFICE VISIT (OUTPATIENT)
Dept: CARDIOLOGY | Facility: CLINIC | Age: 78
End: 2023-08-24
Payer: MEDICARE

## 2023-08-24 VITALS
BODY MASS INDEX: 26.07 KG/M2 | DIASTOLIC BLOOD PRESSURE: 86 MMHG | SYSTOLIC BLOOD PRESSURE: 132 MMHG | OXYGEN SATURATION: 96 % | WEIGHT: 186.2 LBS | HEIGHT: 71 IN | HEART RATE: 63 BPM

## 2023-08-24 DIAGNOSIS — R00.1 BRADYCARDIA: ICD-10-CM

## 2023-08-24 DIAGNOSIS — R00.2 PALPITATIONS: ICD-10-CM

## 2023-08-24 DIAGNOSIS — I35.1 AORTIC VALVE INSUFFICIENCY, ETIOLOGY OF CARDIAC VALVE DISEASE UNSPECIFIED: Primary | ICD-10-CM

## 2023-08-24 DIAGNOSIS — I48.0 PAROXYSMAL ATRIAL FIBRILLATION: ICD-10-CM

## 2023-08-24 DIAGNOSIS — R06.02 SHORTNESS OF BREATH: ICD-10-CM

## 2023-08-24 DIAGNOSIS — I51.9 SYSTOLIC DYSFUNCTION: ICD-10-CM

## 2023-08-24 DIAGNOSIS — R53.83 OTHER FATIGUE: ICD-10-CM

## 2023-08-24 DIAGNOSIS — I10 ESSENTIAL HYPERTENSION: ICD-10-CM

## 2023-08-24 NOTE — PROGRESS NOTES
Subjective   Mia Posadas is a 77 y.o. male     Chief Complaint   Patient presents with    Follow-up     Here for testing f/u    Atrial Fibrillation       PROBLEM LIST:     1.  Paroxysmal atrial fibrillation, on Eliquis  1.1 Failure previously with flecainide therapy.  2.  Hypertension  3.  Low risk nuclear stress test, 9/2019.  4.  Echo, 11-, EF 40-45%,  inferior, inferolateral, and basilar lateral hypokinesis to akinesis. Grade 1 DD, trivial-mild MR, mild AI, trivial-mild TR, prox. Aortic root 3.8 cm, pulm. Pressures high 20's    Specialty Problems          Cardiology Problems    Aortic valve regurgitation        Paroxysmal atrial fibrillation             HPI:    Mr. Posadas returns for follow-up on the above.    He is symptomatically unchanged from the time of his last visit.  He notices decreased functional capacity, increased weakness and fatigue with physical activity particularly in the heat of the day, but he feels his threshold is not changed from prior.  He has no chest pain.  He denies orthopnea, PND, or lower extremity edema.  He continues to have infrequent palpitations which are not associated with chest pain, shortness of breath, or dizziness.  He has had no symptoms of TIA or stroke and he has no bleeding on Eliquis.    Mr. Posadas's major complaint continues to be of tinnitus and sleep disturbance.  Blood pressures remain well controlled.  By description blood pressures occasionally rise to the level of the mid 130s and a 80s in systole and diastole respectively.  Most recent fasting lipid profile demonstrated total cholesterol of 156, triglycerides 166, HDL 35, LDL of 92.  H&H from July of this year was 17.6 and 52.  The patient has had persistently elevated hemoglobin and hematocrit without an elevated white count, without thrombocytosis, without macrocytic indices, and with normal RDW.    Mr. Muñoz never performed stress testing and is discussed [facility]'s last visit.  When he was  called by scheduling he was told he was scheduled for a pharmacologic stress test.  As EKG is normal I think that regular treadmill stress test is appropriate to assess for ischemia.  We also wanted to look at chronotropic sponsor to exercise given the patient's decreased functional capacity.  We will reschedule that study.                      PRIOR MEDICATIONS    Current Outpatient Medications on File Prior to Visit   Medication Sig Dispense Refill    acetaminophen (TYLENOL) 500 MG tablet Take 1 tablet by mouth Every 6 (Six) Hours As Needed for Mild Pain.      amitriptyline (ELAVIL) 25 MG tablet Take 1 tablet by mouth As Needed.      apixaban (ELIQUIS) 5 MG tablet tablet Take 1 tablet by mouth 2 (Two) Times a Day.      clonazePAM (KlonoPIN) 0.5 MG tablet Take 2 tablets by mouth every night at bedtime.      diphenhydrAMINE (BENADRYL) 25 mg capsule Take 1 capsule by mouth every night at bedtime.      lovastatin (MEVACOR) 10 MG tablet Take 2 tablets by mouth Daily.      Melatonin 3 MG capsule Take  by mouth As Needed.      metoprolol tartrate (LOPRESSOR) 25 MG tablet Take one-half tablet by mouth twice a day 60 tablet 4     No current facility-administered medications on file prior to visit.       ALLERGIES:    Ciprofloxacin and Sotalol    PAST MEDICAL HISTORY:    Past Medical History:   Diagnosis Date    Atrial fibrillation     Insomnia     Tinnitus     B ear ringing       SURGICAL HISTORY:    Past Surgical History:   Procedure Laterality Date    KIDNEY STONE SURGERY         SOCIAL HISTORY:    Social History     Socioeconomic History    Marital status:    Tobacco Use    Smoking status: Former     Packs/day: 1.00     Years: 10.00     Pack years: 10.00     Types: Cigarettes    Smokeless tobacco: Former     Types: Chew   Substance and Sexual Activity    Alcohol use: No    Drug use: No    Sexual activity: Defer       FAMILY HISTORY:    Family History   Problem Relation Age of Onset    Breast cancer Mother      "Arrhythmia Mother     Mitral valve prolapse Mother     No Known Problems Father        Review of Systems   Constitutional:  Positive for fatigue.   HENT: Negative.     Eyes:  Positive for visual disturbance (glasses prn).   Respiratory: Negative.     Cardiovascular:  Positive for palpitations (occas. racing). Negative for chest pain and leg swelling.   Gastrointestinal: Negative.    Endocrine: Negative.    Genitourinary: Negative.    Musculoskeletal:  Positive for arthralgias and myalgias.        Denies leg cramps with ambulation   Skin: Negative.    Allergic/Immunologic: Negative.    Neurological: Negative.    Hematological:  Bruises/bleeds easily.   Psychiatric/Behavioral:  Positive for sleep disturbance.      VISIT VITALS:  Vitals:    08/24/23 1205   BP: 132/86   BP Location: Left arm   Patient Position: Sitting   Pulse: 63   SpO2: 96%   Weight: 84.5 kg (186 lb 3.2 oz)   Height: 180.3 cm (70.98\")      /86 (BP Location: Left arm, Patient Position: Sitting)   Pulse 63   Ht 180.3 cm (70.98\")   Wt 84.5 kg (186 lb 3.2 oz)   SpO2 96%   BMI 25.98 kg/mý     RECENT LABS:    Objective       Physical Exam  Vitals and nursing note reviewed.   Constitutional:       General: He is not in acute distress.     Appearance: He is well-developed.   HENT:      Head: Normocephalic and atraumatic.   Eyes:      Conjunctiva/sclera: Conjunctivae normal.      Pupils: Pupils are equal, round, and reactive to light.   Neck:      Vascular: No carotid bruit, hepatojugular reflux or JVD.      Trachea: No tracheal deviation.      Comments: Nl. Carotid upstrokes  Cardiovascular:      Rate and Rhythm: Normal rate and regular rhythm.      Pulses:           Radial pulses are 2+ on the right side and 2+ on the left side.      Heart sounds: Normal heart sounds, S1 normal and S2 normal. No murmur heard.    No friction rub. S4 sounds present. No S3 sounds.   Pulmonary:      Effort: Pulmonary effort is normal.      Breath sounds: Normal breath " sounds. No wheezing, rhonchi or rales.      Comments: Nl. Expir. Phase  Nl. Breath sound intensity  Very very Scant dry crackles rt. base  Abdominal:      General: Bowel sounds are normal. There is abdominal bruit. There is no distension.      Palpations: Abdomen is soft. There is no mass.      Tenderness: There is no abdominal tenderness. There is no guarding or rebound.      Comments: No organomegaly   Musculoskeletal:         General: No tenderness or deformity. Normal range of motion.      Cervical back: Normal range of motion and neck supple.      Right lower leg: No edema.      Left lower leg: No edema.      Comments: BLE, no edema, palpable PT pedal pulses     Skin:     General: Skin is warm and dry.      Coloration: Skin is not pale.      Findings: No erythema or rash.   Neurological:      Mental Status: He is alert and oriented to person, place, and time.   Psychiatric:         Behavior: Behavior normal.         Thought Content: Thought content normal.         Judgment: Judgment normal.       Procedures      Assessment & Plan   #1.  Paroxysmal atrial fibrillation.  The patient is minimally symptomatic of palpitations, has no bleeding on Eliquis, and has no symptoms of TIA or stroke.  We will continue current medications.    2.  Valvular heart disease.  The patient has only mild AI by most recent echo.  This is not detectable on exam today.  We will continue to monitor.    3.  Exertional dyspnea and decreased functional capacity.  We will perform stress testing to look for ischemia and assess chronotropic incompetence.  This needs to be a regular treadmill stress test and nonnuclear study.    4.  Abnormal lung exam.  Mr. Posadas has scant by sounding rales in the right base.  This is unchanged from his previous exam.  I feel this most likely represents a mild degree of scarring and is not compatible with pulmonary congestion he has no consolidation.  We will continue to monitor.    5.  The patient will  follow with Jacqueline Rader as instructed we will plan on seeing him in follow-up on appearing basis for symptoms, stress test findings, or in 9 to 12 months.   Diagnosis Plan   1. Aortic valve insufficiency, etiology of cardiac valve disease unspecified        2. Paroxysmal atrial fibrillation        3. Shortness of breath        4. Other fatigue            No follow-ups on file.         Mia Posadas  reports that he has quit smoking. His smoking use included cigarettes. He has a 10.00 pack-year smoking history. He has quit using smokeless tobacco.  His smokeless tobacco use included chew.. I have educated him on the risk of diseases from using tobacco products such as cancer, COPD, and heart disease.           Advance Care Planning   ACP discussion was held with the patient during this visit. Patient does not have an advance directive, declines further assistance.                    Electronically signed by:    Scribed for Dwain Kennedy MD by Iza Arguelles LPN on August 24, 2023  at 12:09 EDT    I, Dwain Kennedy MD personally performed the services described in this documentation as scribed by the above named individual in my presence, and it is both accurate and complete. August 24, 2023 12:09 EDT      Dictated Utilizing Dragon Dictation: Part of this note may be an electronic transcription/translation of spoken language to printed text using the Dragon Dictation System.

## 2023-10-26 ENCOUNTER — HOSPITAL ENCOUNTER (OUTPATIENT)
Dept: CARDIOLOGY | Facility: HOSPITAL | Age: 78
Discharge: HOME | End: 2023-10-26
Payer: MEDICARE

## 2023-10-26 DIAGNOSIS — I51.9 SYSTOLIC DYSFUNCTION: ICD-10-CM

## 2023-10-26 DIAGNOSIS — R00.1 BRADYCARDIA: ICD-10-CM

## 2023-10-26 DIAGNOSIS — R06.02 SHORTNESS OF BREATH: ICD-10-CM

## 2023-10-26 DIAGNOSIS — R53.83 OTHER FATIGUE: ICD-10-CM

## 2023-10-26 DIAGNOSIS — I10 ESSENTIAL HYPERTENSION: ICD-10-CM

## 2023-10-26 DIAGNOSIS — I48.0 PAROXYSMAL ATRIAL FIBRILLATION: ICD-10-CM

## 2023-10-26 DIAGNOSIS — R00.2 PALPITATIONS: ICD-10-CM

## 2023-10-26 PROCEDURE — 93017 CV STRESS TEST TRACING ONLY: CPT

## 2023-10-28 LAB
BH CV STRESS RECOVERY BP: NORMAL MMHG
BH CV STRESS RECOVERY HR: 85 BPM
MAXIMAL PREDICTED HEART RATE: 144 BPM
PERCENT MAX PREDICTED HR: 89.58 %
STRESS BASELINE BP: NORMAL MMHG
STRESS BASELINE HR: 65 BPM
STRESS PERCENT HR: 105 %
STRESS POST ESTIMATED WORKLOAD: 9.1 METS
STRESS POST EXERCISE DUR MIN: 6 MIN
STRESS POST EXERCISE DUR SEC: 43 SEC
STRESS POST PEAK BP: NORMAL MMHG
STRESS POST PEAK HR: 129 BPM
STRESS TARGET HR: 122 BPM

## 2023-11-02 ENCOUNTER — TELEPHONE (OUTPATIENT)
Dept: CARDIOLOGY | Facility: CLINIC | Age: 78
End: 2023-11-02
Payer: MEDICARE

## 2023-11-02 NOTE — TELEPHONE ENCOUNTER
PATIENT NOTIFIED OF STRESS TEST RESULTS AND TO KEEP F/U APPT. IN MARCH. VERBALIZED OK. PH,LPN          ----- Message from Dwain Kennedy MD sent at 11/2/2023  9:11 AM EDT -----  Routine follow-up as scheduled.  ----- Message -----  From: Dwain Kennedy MD  Sent: 10/28/2023  10:20 AM EDT  To: Dwain Kennedy MD

## 2024-02-26 ENCOUNTER — TELEPHONE (OUTPATIENT)
Dept: CARDIOLOGY | Facility: CLINIC | Age: 79
End: 2024-02-26
Payer: MEDICARE

## 2024-02-26 DIAGNOSIS — I48.0 PAROXYSMAL ATRIAL FIBRILLATION: Primary | ICD-10-CM

## 2024-02-26 DIAGNOSIS — R00.2 PALPITATIONS: ICD-10-CM

## 2024-02-26 NOTE — TELEPHONE ENCOUNTER
SPOKE WITH PATIENT AND HE WILL COME TO THE OFFICE SULMA. FOR 30 DAY EVENT MONITOR. ORDER PLACED. PH,LPN        ----- Message from Dwain Kennedy MD sent at 2/26/2024 12:53 PM EST -----  Regarding: FW: Non regular heart rate  Contact: [phone]  30 day event monitor.   ----- Message -----  From: Iza Arguelles LPN  Sent: 2/26/2024  12:40 PM EST  To: Dwain Kennedy MD  Subject: FW: Non regular heart rate                         ----- Message -----  From: Mia Posadas  Sent: 2/26/2024  12:06 PM EST  To: [facility]  Subject: Non regular heart rate                           This being three days my heart rate has been bad after doing small task for 30-60 minutes, it's near perfect when getting up of the morning, than when I go physical   Walking, feeding etc. My rare goes 3 than miss 1 than 2   And  miss 1 than 4 skip 1 more 3 and miss 1 My rate will show 30s, 40s, 50s only thing that helps is lots of rest  I have an appoint. March 27  I would like to wear a monitor before the visit, then you can make a good decision on  owing forward.   Thank you.

## 2024-03-13 ENCOUNTER — TELEPHONE (OUTPATIENT)
Dept: CARDIOLOGY | Facility: CLINIC | Age: 79
End: 2024-03-13
Payer: MEDICARE

## 2024-03-13 NOTE — TELEPHONE ENCOUNTER
"Relay     \"Called and left voicemail for Mia to call our office to get his appointment on 3/27/2024 rescheduled, is not going to be in the office that day.\"          "

## 2024-04-25 ENCOUNTER — TELEPHONE (OUTPATIENT)
Dept: CARDIOLOGY | Facility: CLINIC | Age: 79
End: 2024-04-25
Payer: MEDICARE

## 2024-04-25 NOTE — TELEPHONE ENCOUNTER
MONITOR RESULTS BRIEFLY DISCUSSED AND AWARE TO KEEP F/U APPT. ON 5-8. VERBALIZED OK. SHAVON STOCK              ----- Message from Dwain Kennedy MD sent at 4/25/2024  1:20 PM EDT -----  Event monitor demonstrated an episode of A-fib with RVR.  There was a normal ventricular response rate.  The patient had no pauses or no sustained ventricular ectopy.  Keep follow-up appointment as scheduled.  ----- Message -----  From: Dwain Kennedy MD  Sent: 4/14/2024   5:43 PM EDT  To: Dwain Kennedy MD

## 2024-05-10 ENCOUNTER — OFFICE VISIT (OUTPATIENT)
Dept: CARDIOLOGY | Facility: CLINIC | Age: 79
End: 2024-05-10
Payer: MEDICARE

## 2024-05-10 VITALS
BODY MASS INDEX: 25.62 KG/M2 | DIASTOLIC BLOOD PRESSURE: 65 MMHG | SYSTOLIC BLOOD PRESSURE: 117 MMHG | HEART RATE: 63 BPM | OXYGEN SATURATION: 95 % | WEIGHT: 179 LBS | HEIGHT: 70 IN

## 2024-05-10 DIAGNOSIS — I48.0 PAROXYSMAL ATRIAL FIBRILLATION: Primary | ICD-10-CM

## 2024-05-10 DIAGNOSIS — R06.02 SHORTNESS OF BREATH: ICD-10-CM

## 2024-05-10 DIAGNOSIS — R00.2 PALPITATIONS: ICD-10-CM

## 2024-05-10 PROCEDURE — 99213 OFFICE O/P EST LOW 20 MIN: CPT | Performed by: PHYSICIAN ASSISTANT

## 2024-05-10 PROCEDURE — 1159F MED LIST DOCD IN RCRD: CPT | Performed by: PHYSICIAN ASSISTANT

## 2024-05-10 PROCEDURE — 1160F RVW MEDS BY RX/DR IN RCRD: CPT | Performed by: PHYSICIAN ASSISTANT

## 2024-08-27 RX ORDER — METOPROLOL TARTRATE 25 MG/1
TABLET, FILM COATED ORAL
Qty: 60 TABLET | Refills: 11 | Status: SHIPPED | OUTPATIENT
Start: 2024-08-27

## 2024-11-15 ENCOUNTER — TELEPHONE (OUTPATIENT)
Dept: CARDIOLOGY | Facility: CLINIC | Age: 79
End: 2024-11-15

## 2024-11-15 NOTE — TELEPHONE ENCOUNTER
Caller: Mia Posadas    Relationship: Self    Best call back number: [phone]     What is the best time to reach you: ANY LEAVE VM     What was the call regarding: PT IS WONDERING IF THE [facility] REFS PTS TO [facility]. WOULD LIKE ANY AND ALL INFO OFFICE HAS ON THIS. I RECOMMENDED PT CHECK WITH [facility] BUT STATES HE WANTED INFO FROM OUR OFFICE FIRST, PLEASE ADVISE.     Is it okay if the provider responds through MyChart: FINE

## 2024-11-15 NOTE — TELEPHONE ENCOUNTER
Hub staff attempted to follow warm transfer process and was unsuccessful     Caller: Mia Posadas    Relationship to patient: Self    Best call back number: [phone]    Patient is needing: PATIENT ATTEMPTING TO RETURN CALL, NO  PLEASE CALL THEM BACK.

## 2025-01-20 ENCOUNTER — OFFICE VISIT (OUTPATIENT)
Dept: CARDIOLOGY | Facility: CLINIC | Age: 80
End: 2025-01-20
Payer: MEDICARE

## 2025-01-20 VITALS
WEIGHT: 178 LBS | DIASTOLIC BLOOD PRESSURE: 74 MMHG | HEIGHT: 70 IN | HEART RATE: 67 BPM | OXYGEN SATURATION: 96 % | SYSTOLIC BLOOD PRESSURE: 126 MMHG | BODY MASS INDEX: 25.48 KG/M2

## 2025-01-20 DIAGNOSIS — I48.0 PAROXYSMAL ATRIAL FIBRILLATION: Primary | ICD-10-CM

## 2025-01-20 DIAGNOSIS — R06.02 SHORTNESS OF BREATH: ICD-10-CM

## 2025-01-20 DIAGNOSIS — I10 ESSENTIAL HYPERTENSION: ICD-10-CM

## 2025-01-20 PROCEDURE — 1159F MED LIST DOCD IN RCRD: CPT | Performed by: PHYSICIAN ASSISTANT

## 2025-01-20 PROCEDURE — 93000 ELECTROCARDIOGRAM COMPLETE: CPT | Performed by: PHYSICIAN ASSISTANT

## 2025-01-20 PROCEDURE — 1160F RVW MEDS BY RX/DR IN RCRD: CPT | Performed by: PHYSICIAN ASSISTANT

## 2025-01-20 PROCEDURE — 99213 OFFICE O/P EST LOW 20 MIN: CPT | Performed by: PHYSICIAN ASSISTANT

## 2025-01-20 NOTE — PROGRESS NOTES
Problem list     Subjective   Mia Posadas is a 79 y.o. male     Chief Complaint   Patient presents with    Follow-up     8 month follow up     Palpitations   PROBLEM LIST:      1.  Paroxysmal atrial fibrillation, on Eliquis  1.1 Failure previously with flecainide therapy.  2.  Hypertension  3.  Low risk nuclear stress test, 9/2019.  4.  Echo, 11-, EF 40-45%,  inferior, inferolateral, and basilar lateral hypokinesis to akinesis. Grade 1 DD, trivial-mild MR, mild AI, trivial-mild TR, prox. Aortic root 3.8 cm, pulm. Pressures high 20's    HPI    The patient presents to the clinic today for follow-up.  He has been followed from cardiovascular standpoint primarily because of A-fib.  He was treated historically with flecainide, but was unable to tolerate that and discontinued that on his own accord.  Beta-blocker therapy has been utilized since, which he adjusts on his own at home.  He reports that he typically only takes the metoprolol if he has significant symptoms of A-fib.  A-fib is typically very minimal, until summer months by his report.  He is asymptomatic mostly of A-fib at this time.  He was also followed because of a degree of bradycardia and aortic insufficiency noted previously.  We did review with him that his most recent echo supported nonhemodynamically significant aortic insufficiency, mostly noted to be mild by echo findings.  Bradycardia has been nonproblematic as of recent, with no symptoms to suggest the same.  He denies chest pain.  He has no failure symptoms.  He has no further complaints.  Current Outpatient Medications on File Prior to Visit   Medication Sig Dispense Refill    acetaminophen (TYLENOL) 500 MG tablet Take 1 tablet by mouth Every 6 (Six) Hours As Needed for Mild Pain.      amitriptyline (ELAVIL) 25 MG tablet Take 1 tablet by mouth As Needed.      apixaban (ELIQUIS) 5 MG tablet tablet Take 1 tablet by mouth 2 (Two) Times a Day.      clonazePAM (KlonoPIN) 0.5 MG tablet Take 2  tablets by mouth every night at bedtime.      diphenhydrAMINE (BENADRYL) 25 mg capsule Take 1 capsule by mouth every night at bedtime.      lovastatin (MEVACOR) 20 MG tablet Take 1 tablet by mouth Daily.      Melatonin 3 MG capsule Take  by mouth As Needed.      metoprolol tartrate (LOPRESSOR) 25 MG tablet Take one-half tablet by mouth twice a day 60 tablet 11     No current facility-administered medications on file prior to visit.       Ciprofloxacin and Sotalol    Past Medical History:   Diagnosis Date    Atrial fibrillation     Insomnia     Tinnitus     B ear ringing       Social History     Socioeconomic History    Marital status:    Tobacco Use    Smoking status: Former     Current packs/day: 1.00     Average packs/day: 1 pack/day for 10.0 years (10.0 ttl pk-yrs)     Types: Cigarettes    Smokeless tobacco: Former     Types: Chew   Substance and Sexual Activity    Alcohol use: No    Drug use: No    Sexual activity: Defer       Family History   Problem Relation Age of Onset    Breast cancer Mother     Arrhythmia Mother     Mitral valve prolapse Mother     No Known Problems Father        Review of Systems   Constitutional:  Negative for chills, diaphoresis, fatigue and fever.   HENT:  Positive for hearing loss.    Eyes: Negative.  Negative for visual disturbance.   Respiratory: Negative.  Negative for apnea, cough, chest tightness, shortness of breath and wheezing.    Cardiovascular:  Positive for palpitations. Negative for chest pain and leg swelling.   Gastrointestinal: Negative.  Negative for abdominal pain and blood in stool.   Endocrine: Negative.  Negative for cold intolerance and heat intolerance.   Genitourinary: Negative.  Negative for hematuria.   Musculoskeletal:  Positive for back pain. Negative for arthralgias, myalgias, neck pain and neck stiffness.   Skin: Negative.  Negative for rash and wound.   Allergic/Immunologic: Negative.  Negative for environmental allergies and food allergies.  "  Neurological:  Negative for dizziness, syncope, weakness, light-headedness, numbness and headaches.   Hematological:  Bruises/bleeds easily (eliquis).   Psychiatric/Behavioral: Negative.  Negative for sleep disturbance.        Objective   Vitals:    01/20/25 1406   BP: 126/74   Pulse: 67   SpO2: 96%   Weight: 80.7 kg (178 lb)   Height: 177.8 cm (70\")      /74   Pulse 67   Ht 177.8 cm (70\")   Wt 80.7 kg (178 lb)   SpO2 96%   BMI 25.54 kg/m²    Lab Results (most recent)       None          Physical Exam  Vitals and nursing note reviewed.   Constitutional:       General: He is not in acute distress.     Appearance: He is well-developed.   HENT:      Head: Normocephalic and atraumatic.   Eyes:      Conjunctiva/sclera: Conjunctivae normal.      Pupils: Pupils are equal, round, and reactive to light.   Neck:      Vascular: No JVD.      Trachea: No tracheal deviation.   Cardiovascular:      Rate and Rhythm: Normal rate and regular rhythm.      Heart sounds: Normal heart sounds.      Comments: Minimal systolic ejection murmur noted.  Pulmonary:      Effort: Pulmonary effort is normal.      Breath sounds: Normal breath sounds.      Comments: Dry crackles in periphery.  Abdominal:      General: Bowel sounds are normal. There is no distension.      Palpations: Abdomen is soft. There is no mass.      Tenderness: There is no abdominal tenderness. There is no guarding or rebound.   Musculoskeletal:         General: No tenderness or deformity. Normal range of motion.      Cervical back: Normal range of motion and neck supple.   Skin:     General: Skin is warm and dry.      Coloration: Skin is not pale.      Findings: No erythema or rash.   Neurological:      Mental Status: He is alert and oriented to person, place, and time.   Psychiatric:         Behavior: Behavior normal.         Thought Content: Thought content normal.         Judgment: Judgment normal.           Procedure     ECG 12 Lead    Date/Time: 1/20/2025 " 2:23 PM  Performed by: Ethan Garcia PA    Authorized by: Ethan Garcia PA  Comparison: compared with previous ECG from 3/22/2022  Comparison to previous ECG: Sinus rhythm, rate 64, normal axis, nonspecific ST and T wave changes, no acute changes noted.             Assessment & Plan      Diagnosis Plan   1. Paroxysmal atrial fibrillation        2. Shortness of breath        3. Essential hypertension          1.  The patient is in for routine evaluation and follow-up.  He utilizes metoprolol for treatment of A-fib as above.  He tolerates anticoagulation without complication.  He did not do well with flecainide therapy in the past and we will not rechallenge antidysrhythmic therapy at his request.  He wants to minimize medical regimen.    2.  Prior workup has been largely benign.  There was concern at 1 time with aortic insufficiency, but this was felt mild by most recent echo.  There was also a degree of systolic dysfunction historically.  I have discussed updating echocardiogram to reevaluate both parameters.  He feels so well that he would like to hold on that.  He will return for problems or if he changes his mind.    3.  For now, no adjustments will be made to medical regimen.  He appears to be fairly well treated and doing well.  He does note that he is normotensive at this time.  He will monitor this and call for complications.    4.  Nothing further and we will see him at 6-month intervals, sooner for complications.           Mia Posadas  reports that he has quit smoking. His smoking use included cigarettes. He has a 10 pack-year smoking history. He has quit using smokeless tobacco.  His smokeless tobacco use included chew. I have educated him on the risk of diseases from using tobacco products such as cancer, COPD, and heart disease.     I advised him to quit and he is not willing to quit.    I spent 3  minutes counseling the patient.          Advance Care Planning   ACP discussion was held with  the patient during this visit. Patient does not have an advance directive, declines further assistance.                Electronically signed by:

## 2025-04-23 ENCOUNTER — TELEPHONE (OUTPATIENT)
Dept: CARDIOLOGY | Facility: CLINIC | Age: 80
End: 2025-04-23
Payer: MEDICARE

## 2025-04-23 NOTE — TELEPHONE ENCOUNTER
Ethan Garcia PA to Me  (Selected Message)    4/23/25  4:41 PM  This is likely reflective of known atrial fibrillation.  We can do rhythm suppressive therapy if needed.

## 2025-04-23 NOTE — TELEPHONE ENCOUNTER
Patient reports his heart rate is running 100, staying there. It started as palpitations, no chest pain, no headaches, no dizziness, no SOB. BP is 141/95. Now his heart rate has dropped to 74 while laying back in a recliner. This happens about once every month or two.

## 2025-04-23 NOTE — TELEPHONE ENCOUNTER
Caller: Mia Posadas    Relationship: Self    Best call back number: [phone]     What is the best time to reach you: ANYTIME    Who are you requesting to speak with (clinical staff, provider,  specific staff member): PROVIDER    Do you know the name of the person who called: NA    What was the call regarding: PT REPORTS HR RUNNING 20 POINTS HIGHER RESTING, AROUND 100. DOES THIS ABOUT ONCE A MONTH. WANTS ADVISEMENT TO SEE WHEN THIS HAPPENS, IF NEED TO ADJUST MEDICATION WHILE THIS HAPPENS? PT REPORTS 130 HR ON FEET WHEN WALKING. OR MAYBE LIKE A TEMPORARY ADJUSTMENT WHILE THIS IS SPIKED UP? PLEASE ADVISE.     Is it okay if the provider responds through [employer]: NO

## 2025-04-24 NOTE — TELEPHONE ENCOUNTER
The patient called and he would like to know if he can increase his metoprolol when having an incident instead of starting rhythm suppressive therapy. Please advise.

## 2025-04-24 NOTE — TELEPHONE ENCOUNTER
Ethan Garcia PA to Me  (Selected Message)    4/24/25  4:39 PM  Yes.  He can take the other half of the metoprolol during an episode.

## 2025-06-09 ENCOUNTER — TELEPHONE (OUTPATIENT)
Dept: CARDIOLOGY | Facility: CLINIC | Age: 80
End: 2025-06-09
Payer: MEDICARE

## 2025-06-09 NOTE — TELEPHONE ENCOUNTER
Caller: JAIDEN    Relationship to patient: Provider - [facility]    Best call back number: [phone]    Chief complaint: PATIENT IS GOING IN AND OUT OF AFIB - PROVIDER SAYS HE NEEDS TO BE SEEN EARLIER THAN OCTOBER.    Type of visit: FOLLOW UP    Requested date: FIRST AVAILABLE    If rescheduling, when is the original appointment: 10.22.25    Additional notes:PLEASE CALL PATIENT WITH EARLIER AVAILABILITY IF POSSIBLE.

## 2025-06-10 NOTE — TELEPHONE ENCOUNTER
My chart message sent to patient stating information pertaining to watchman device ready for pickup.

## 2025-06-16 ENCOUNTER — OFFICE VISIT (OUTPATIENT)
Dept: CARDIOLOGY | Facility: CLINIC | Age: 80
End: 2025-06-16
Payer: MEDICARE

## 2025-06-16 VITALS
SYSTOLIC BLOOD PRESSURE: 112 MMHG | BODY MASS INDEX: 25.05 KG/M2 | DIASTOLIC BLOOD PRESSURE: 78 MMHG | WEIGHT: 175 LBS | HEART RATE: 58 BPM | HEIGHT: 70 IN | OXYGEN SATURATION: 96 %

## 2025-06-16 DIAGNOSIS — I48.0 PAROXYSMAL ATRIAL FIBRILLATION: Primary | ICD-10-CM

## 2025-06-16 DIAGNOSIS — I42.8 NICM (NONISCHEMIC CARDIOMYOPATHY): ICD-10-CM

## 2025-06-16 DIAGNOSIS — I77.810 AORTIC ROOT DILATATION: ICD-10-CM

## 2025-06-16 DIAGNOSIS — R06.02 SHORTNESS OF BREATH: ICD-10-CM

## 2025-06-16 DIAGNOSIS — E78.5 DYSLIPIDEMIA: ICD-10-CM

## 2025-06-16 DIAGNOSIS — I35.1 NONRHEUMATIC AORTIC VALVE INSUFFICIENCY: ICD-10-CM

## 2025-06-16 PROCEDURE — 99214 OFFICE O/P EST MOD 30 MIN: CPT | Performed by: SPECIALIST

## 2025-06-16 RX ORDER — METOPROLOL TARTRATE 25 MG/1
12.5 TABLET, FILM COATED ORAL 2 TIMES DAILY
Qty: 60 TABLET | Refills: 11 | Status: SHIPPED | OUTPATIENT
Start: 2025-06-16

## 2025-06-16 RX ORDER — VALSARTAN 40 MG/1
40 TABLET ORAL DAILY
Qty: 90 TABLET | Refills: 3 | Status: SHIPPED | OUTPATIENT
Start: 2025-06-16

## 2025-06-16 RX ORDER — LOVASTATIN 20 MG/1
20 TABLET ORAL DAILY
Qty: 90 TABLET | Refills: 1 | Status: SHIPPED | OUTPATIENT
Start: 2025-06-16

## 2025-06-16 NOTE — PROGRESS NOTES
Subjective   Follow up, PAF, HTN, CMP  Mia Posadas is a 79 y.o. male who presents to day for Atrial Fibrillation.    CHIEF COMPLIANT  Chief Complaint   Patient presents with    Atrial Fibrillation       Active Problems:  1.  Paroxysmal atrial fibrillation, on Eliquis  1.1 Failure previously with flecainide therapy.  2.  Hypertension  3.  Low risk nuclear stress test, 9/2019.  4.  Echo, 11-, EF 40-45%,  inferior, inferolateral, and basilar lateral hypokinesis to akinesis. Grade 1 DD, trivial-mild MR, mild AI, trivial-mild TR, prox. Aortic root 3.8 cm, pulm. Pressures high 20's    Problem List Items Addressed This Visit          Cardiac and Vasculature    Paroxysmal atrial fibrillation - Primary    Relevant Medications    apixaban (ELIQUIS) 5 MG tablet tablet    metoprolol tartrate (LOPRESSOR) 25 MG tablet    Other Relevant Orders    Ambulatory Referral to Cardiac Electrophysiology    Adult Transthoracic Echo Complete W/ Cont if Necessary Per Protocol    Aortic valve regurgitation    Relevant Medications    metoprolol tartrate (LOPRESSOR) 25 MG tablet    Dyslipidemia    Relevant Medications    lovastatin (MEVACOR) 20 MG tablet    Other Relevant Orders    Lipid Panel    Comprehensive Metabolic Panel    NICM (nonischemic cardiomyopathy)    Relevant Medications    valsartan (DIOVAN) 40 MG tablet    metoprolol tartrate (LOPRESSOR) 25 MG tablet    Aortic root dilatation       Pulmonary and Pneumonias    Shortness of breath    Relevant Orders    Adult Transthoracic Echo Complete W/ Cont if Necessary Per Protocol       HPI  Atrial Fibrillation  Symptoms include palpitations. Past medical history includes atrial fibrillation.       History of Present Illness  The patient presents for atrial fibrillation, hyperlipidemia, and weak heart muscle.    He has been experiencing episodes of atrial fibrillation, with the most recent episode lasting 36 hours. He reports a high heart rate during these episodes, but does not  experience chest pain or shortness of breath. His heart rate can reach up to 200 beats per minute if left unmanaged. He is currently on a beta blocker, which he takes in the late afternoon to manage his heart rate. This medication can lower his heart rate to the 40s or low 50s during sleep, which causes him some concern. He experiences these episodes approximately once a month, with no discernible pattern. He has been advised against taking rhythm medications due to potential side effects.    He has undergone several sleep studies, including one at home, all of which were negative for sleep apnea. He reports no snoring and consumes minimal caffeine, limited to two cups of decaffeinated coffee per day. He does not consume alcohol.    He is considering the use of fish oil for cholesterol management and is curious about its potential impact on atrial fibrillation. He is also interested in learning more about the Watchman device. He has been advised to increase his metoprolol dosage, but notes that it takes longer to take effect if taken on a full stomach. He can maintain his heart rate below 110 by remaining still, but any physical activity can cause it to rise to 140 or 150. He typically takes his metoprolol in the morning before becoming active.    He has a history of insomnia and typically sleeps for 5 to 6 hours per night.    He has been informed that his heart muscle is weak and is curious about the potential impact of physical activity on this condition. He has not been prescribed any medication for his weak heart muscle.    SOCIAL HISTORY  Exercise: The patient works in the garden and engages in physical activity such as jogging.  Diet: The patient drinks a lot of cold water.  Alcohol: The patient does not drink alcohol.  Coffee/Tea/Caffeine-containing Drinks: The patient drinks about 2 cups of half-caf coffee per day.       PRIOR MEDS  Current Outpatient Medications on File Prior to Visit   Medication Sig  Dispense Refill    acetaminophen (TYLENOL) 500 MG tablet Take 1 tablet by mouth Every 6 (Six) Hours As Needed for Mild Pain.      amitriptyline (ELAVIL) 25 MG tablet Take 1 tablet by mouth As Needed.      clonazePAM (KlonoPIN) 0.5 MG tablet Take 2 tablets by mouth every night at bedtime.      diphenhydrAMINE (BENADRYL) 25 mg capsule Take 1 capsule by mouth every night at bedtime.      Melatonin 3 MG capsule Take  by mouth As Needed.      [DISCONTINUED] apixaban (ELIQUIS) 5 MG tablet tablet Take 1 tablet by mouth 2 (Two) Times a Day.      [DISCONTINUED] lovastatin (MEVACOR) 20 MG tablet Take 1 tablet by mouth Daily.      [DISCONTINUED] metoprolol tartrate (LOPRESSOR) 25 MG tablet Take one-half tablet by mouth twice a day 60 tablet 11     No current facility-administered medications on file prior to visit.       ALLERGIES  Ciprofloxacin and Sotalol    HISTORY  Past Medical History:   Diagnosis Date    Abnormal ECG     Followup    Arrhythmia 2019    Controled    Atrial fibrillation     Insomnia     Tinnitus     B ear ringing       Social History     Socioeconomic History    Marital status:    Tobacco Use    Smoking status: Former     Current packs/day: 0.00     Average packs/day: 1 pack/day for 10.0 years (10.0 ttl pk-yrs)     Types: Cigarettes     Start date: 1968     Quit date: 1978     Years since quittin.4    Smokeless tobacco: Former     Types: Chew    Tobacco comments:     None, no problems   Vaping Use    Vaping status: Never Used   Substance and Sexual Activity    Alcohol use: Never    Drug use: Never    Sexual activity: Not Currently     Partners: Female     Birth control/protection: Abstinence       Family History   Problem Relation Age of Onset    Breast cancer Mother     Arrhythmia Mother         Mitro valve    Mitral valve prolapse Mother     Arrhythmia Father         Age 96 Afib       Review of Systems   Constitutional:  Positive for fatigue.   HENT:  Positive for tinnitus.   "  Eyes: Negative.    Respiratory: Negative.     Cardiovascular:  Positive for palpitations.   Gastrointestinal: Negative.    Endocrine: Negative.    Genitourinary: Negative.    Musculoskeletal: Negative.    Skin: Negative.    Allergic/Immunologic: Negative.    Neurological: Negative.    Hematological:  Bruises/bleeds easily.   Psychiatric/Behavioral: Negative.         Objective     VITALS: /78 (BP Location: Right arm, Patient Position: Sitting, Cuff Size: Adult)   Pulse 58   Ht 177.8 cm (70\")   Wt 79.4 kg (175 lb)   SpO2 96%   BMI 25.11 kg/m²     LABS:   Lab Results (most recent)       None            IMAGING:   No Images in the past 120 days found..    EXAM:  Physical Exam  Constitutional:       Appearance: He is well-developed.   HENT:      Head: Normocephalic and atraumatic.   Eyes:      Pupils: Pupils are equal, round, and reactive to light.   Neck:      Thyroid: No thyromegaly.      Vascular: No JVD.   Cardiovascular:      Rate and Rhythm: Normal rate and regular rhythm.      Chest Wall: PMI is not displaced.      Pulses: Normal pulses.      Heart sounds: Normal heart sounds, S1 normal and S2 normal. No murmur heard.     No friction rub. No gallop.   Pulmonary:      Effort: Pulmonary effort is normal. No respiratory distress.      Breath sounds: Normal breath sounds. No stridor. No wheezing or rales.   Chest:      Chest wall: No tenderness.   Abdominal:      General: Bowel sounds are normal. There is no distension.      Palpations: Abdomen is soft. There is no mass.      Tenderness: There is no abdominal tenderness. There is no guarding or rebound.   Musculoskeletal:      Cervical back: Neck supple. No edema.   Skin:     General: Skin is warm and dry.      Coloration: Skin is not pale.      Findings: No erythema or rash.   Neurological:      Mental Status: He is alert and oriented to person, place, and time.      Cranial Nerves: No cranial nerve deficit.      Coordination: Coordination normal. "   Psychiatric:         Behavior: Behavior normal.       Physical Exam  Blood Pressure: 112/78  Heart: Regular rate and rhythm, no murmurs, gallops, or rubs.  Lungs: Clear to auscultation bilaterally, no wheezes, rales, or rhonchi.       Procedure   Procedures      Results  Labs   - Cholesterol levels: 07/2023, Slightly elevated   - Triglycerides: 07/2023, High   - Good cholesterol: 07/2023, Low    Imaging   - Echocardiogram: 2022, Slightly weak heart muscle       Assessment & Plan     Diagnoses and all orders for this visit:    1. Paroxysmal atrial fibrillation (Primary)  -     Ambulatory Referral to Cardiac Electrophysiology  -     Adult Transthoracic Echo Complete W/ Cont if Necessary Per Protocol; Future  -     apixaban (ELIQUIS) 5 MG tablet tablet; Take 1 tablet by mouth Every 12 (Twelve) Hours.  Dispense: 180 tablet; Refill: 1  -     metoprolol tartrate (LOPRESSOR) 25 MG tablet; Take 0.5 tablets by mouth 2 (Two) Times a Day.  Dispense: 60 tablet; Refill: 11    2. Shortness of breath  -     Adult Transthoracic Echo Complete W/ Cont if Necessary Per Protocol; Future    3. Nonrheumatic aortic valve insufficiency    4. Dyslipidemia  -     lovastatin (MEVACOR) 20 MG tablet; Take 1 tablet by mouth Daily.  Dispense: 90 tablet; Refill: 1  -     Lipid Panel; Future  -     Comprehensive Metabolic Panel; Future    5. NICM (nonischemic cardiomyopathy)  -     valsartan (DIOVAN) 40 MG tablet; Take 1 tablet by mouth Daily.  Dispense: 90 tablet; Refill: 3    6. Aortic root dilatation      Assessment & Plan  Mia Posadas  reports that he quit smoking about 47 years ago. His smoking use included cigarettes. He started smoking about 56 years ago. He has a 10 pack-year smoking history. He has quit using smokeless tobacco.  His smokeless tobacco use included chew. I have educated him on the risk of diseases from using tobacco products such as cancer, COPD, and heart disease.           1. Paroxysmal atrial Fibrillation:  - Fish  oil does not contribute to atrial fibrillation and may reduce its likelihood.  - Discussed the Watchman device, including its function and potential complications.  - Clarified that tinnitus is not related to blood thinner medication.  - Considered sleep apnea as a contributing factor; previous sleep tests were negative.  - Last echocardiogram in 2022 revealed a slightly weak heart muscle.  - Explained the relationship between atrial fibrillation and weak heart muscle.  - Reassured that physical activity would not exacerbate atrial fibrillation.  - Referral to Dr. Felix for ablation therapy.  - Echocardiogram will be ordered.  - Prescription for valsartan to strengthen heart muscle.  - Advised to monitor blood pressure at home and ensure it remains above 100 systolic.  - Blood work will be conducted prior to the next visit.    2. Hyperlipidemia:  - Cholesterol levels were slightly elevated in 07/2023, with high triglycerides and low HDL.  - Advised to start taking fish oil as recommended by the primary care physician.    3. Dilated cardiomyopathy:  - Last echocardiogram in 2022 revealed a mild systolic dysfunction.  - Explained the relationship between atrial fibrillation and weak heart muscle.  - Reassured that physical activity would not exacerbate atrial fibrillation.  - Prescription for valsartan to strengthen heart muscle.  - Advised to monitor blood pressure at home and ensure it remains above 100 systolic.    Follow-up:  - Follow up in 3 months.       Return in about 3 months (around 9/16/2025), or With Ethan sarkar.      Advance Care Planning   ACP discussion was held with the patient during this visit. Patient does not have an advance directive, declines further assistance.             MEDS ORDERED DURING VISIT:  New Medications Ordered This Visit   Medications    valsartan (DIOVAN) 40 MG tablet     Sig: Take 1 tablet by mouth Daily.     Dispense:  90 tablet     Refill:  3    apixaban (ELIQUIS) 5 MG  tablet tablet     Sig: Take 1 tablet by mouth Every 12 (Twelve) Hours.     Dispense:  180 tablet     Refill:  1    lovastatin (MEVACOR) 20 MG tablet     Sig: Take 1 tablet by mouth Daily.     Dispense:  90 tablet     Refill:  1    metoprolol tartrate (LOPRESSOR) 25 MG tablet     Sig: Take 0.5 tablets by mouth 2 (Two) Times a Day.     Dispense:  60 tablet     Refill:  11         As always, Jacqueline Rader APRN  I appreciate very much the opportunity to participate in the cardiovascular care of your patients. Please do not hesitate to call me with any questions with regards to Mia Posadas evaluation and management.     Patient or patient representative verbalized consent for the use of Ambient Listening during the visit with  Nader Linares MD for chart documentation. 6/16/2025  11:35 EDT       This document has been electronically signed by Nader Linares MD  June 16, 2025 12:22 EDT    This note is dictated utilizing voice recognition software.